# Patient Record
Sex: FEMALE | Race: BLACK OR AFRICAN AMERICAN | NOT HISPANIC OR LATINO | Employment: UNEMPLOYED | URBAN - METROPOLITAN AREA
[De-identification: names, ages, dates, MRNs, and addresses within clinical notes are randomized per-mention and may not be internally consistent; named-entity substitution may affect disease eponyms.]

---
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
## 2020-06-01 ENCOUNTER — OFFICE VISIT (OUTPATIENT)
Dept: FAMILY MEDICINE | Facility: CLINIC | Age: 20
End: 2020-06-01
Payer: COMMERCIAL

## 2020-06-01 VITALS
OXYGEN SATURATION: 100 % | WEIGHT: 168.8 LBS | BODY MASS INDEX: 26.49 KG/M2 | TEMPERATURE: 98.4 F | HEIGHT: 67 IN | RESPIRATION RATE: 18 BRPM | SYSTOLIC BLOOD PRESSURE: 125 MMHG | DIASTOLIC BLOOD PRESSURE: 83 MMHG | HEART RATE: 76 BPM

## 2020-06-01 DIAGNOSIS — O20.9 VAGINAL BLEEDING IN PREGNANCY, FIRST TRIMESTER: ICD-10-CM

## 2020-06-01 DIAGNOSIS — O20.9 VAGINAL BLEEDING IN PREGNANCY, FIRST TRIMESTER: Primary | ICD-10-CM

## 2020-06-01 ASSESSMENT — MIFFLIN-ST. JEOR: SCORE: 1562.17

## 2020-06-01 NOTE — PROGRESS NOTES
Preceptor Attestation:   Patient seen, evaluated and discussed with the resident. I have verified the content of the note, which accurately reflects my assessment of the patient and the plan of care.  Supervising Physician:Eliza Faulkner MD  [facility]

## 2020-06-02 ENCOUNTER — TELEPHONE (OUTPATIENT)
Dept: FAMILY MEDICINE | Facility: CLINIC | Age: 20
End: 2020-06-02

## 2020-06-02 NOTE — TELEPHONE ENCOUNTER
[facility] phone call message- patient requesting results:    Test: Lab    Date of test: 06-    Additional Comments: Patient wanting to know if blood work results are back.    OK to leave a message on voice mail? Yes    Primary language: English      needed? No    Call taken on June 2, 2020 at 9:54 AM by Buzz Wolf

## 2020-07-07 ENCOUNTER — PRENATAL OFFICE VISIT - HEALTHEAST (OUTPATIENT)
Dept: MIDWIFE SERVICES | Facility: CLINIC | Age: 20
End: 2020-07-07

## 2020-07-07 DIAGNOSIS — O03.9 SAB (SPONTANEOUS ABORTION): ICD-10-CM

## 2020-07-07 LAB — HCG SERPL-ACNC: 2 MLU/ML (ref 0–4)

## 2020-07-07 ASSESSMENT — MIFFLIN-ST. JEOR: SCORE: 1579.62

## 2020-07-08 ENCOUNTER — COMMUNICATION - HEALTHEAST (OUTPATIENT)
Dept: ADMINISTRATIVE | Facility: CLINIC | Age: 20
End: 2020-07-08

## 2020-07-08 DIAGNOSIS — N93.9 ABNORMAL VAGINAL BLEEDING: ICD-10-CM

## 2020-07-09 ENCOUNTER — AMBULATORY - HEALTHEAST (OUTPATIENT)
Dept: SCHEDULING | Facility: CLINIC | Age: 20
End: 2020-07-09

## 2020-07-09 DIAGNOSIS — N93.9 ABNORMAL VAGINAL BLEEDING: ICD-10-CM

## 2020-08-14 ENCOUNTER — COMMUNICATION - HEALTHEAST (OUTPATIENT)
Dept: SCHEDULING | Facility: CLINIC | Age: 20
End: 2020-08-14

## 2020-08-17 ENCOUNTER — OFFICE VISIT - HEALTHEAST (OUTPATIENT)
Dept: FAMILY MEDICINE | Facility: CLINIC | Age: 20
End: 2020-08-17

## 2020-08-17 DIAGNOSIS — O21.0 HYPEREMESIS GRAVIDARUM: ICD-10-CM

## 2020-08-18 ENCOUNTER — PRENATAL OFFICE VISIT - HEALTHEAST (OUTPATIENT)
Dept: MIDWIFE SERVICES | Facility: CLINIC | Age: 20
End: 2020-08-18

## 2020-08-18 ENCOUNTER — AMBULATORY - HEALTHEAST (OUTPATIENT)
Dept: MIDWIFE SERVICES | Facility: CLINIC | Age: 20
End: 2020-08-18

## 2020-08-18 DIAGNOSIS — Z34.80 SUPERVISION OF OTHER NORMAL PREGNANCY, ANTEPARTUM: ICD-10-CM

## 2020-08-18 DIAGNOSIS — Z87.59 HISTORY OF MISCARRIAGE: ICD-10-CM

## 2020-08-18 LAB
BASOPHILS # BLD AUTO: 0.1 THOU/UL (ref 0–0.2)
BASOPHILS NFR BLD AUTO: 1 % (ref 0–2)
EOSINOPHIL # BLD AUTO: 0.1 THOU/UL (ref 0–0.4)
EOSINOPHIL NFR BLD AUTO: 1 % (ref 0–6)
ERYTHROCYTE [DISTWIDTH] IN BLOOD BY AUTOMATED COUNT: 13.8 % (ref 11–14.5)
HBA1C MFR BLD: 5.5 %
HCT VFR BLD AUTO: 40.7 % (ref 35–47)
HGB BLD-MCNC: 13.2 G/DL (ref 12–16)
HIV 1+2 AB+HIV1 P24 AG SERPL QL IA: NEGATIVE
IMM GRANULOCYTES # BLD: 0 THOU/UL
IMM GRANULOCYTES NFR BLD: 0 %
LYMPHOCYTES # BLD AUTO: 2.4 THOU/UL (ref 0.8–4.4)
LYMPHOCYTES NFR BLD AUTO: 23 % (ref 20–40)
MCH RBC QN AUTO: 26.7 PG (ref 27–34)
MCHC RBC AUTO-ENTMCNC: 32.4 G/DL (ref 32–36)
MCV RBC AUTO: 82 FL (ref 80–100)
MONOCYTES # BLD AUTO: 0.7 THOU/UL (ref 0–0.9)
MONOCYTES NFR BLD AUTO: 7 % (ref 2–10)
NEUTROPHILS # BLD AUTO: 7 THOU/UL (ref 2–7.7)
NEUTROPHILS NFR BLD AUTO: 69 % (ref 50–70)
PLATELET # BLD AUTO: 272 THOU/UL (ref 140–440)
PMV BLD AUTO: 11.1 FL (ref 8.5–12.5)
RBC # BLD AUTO: 4.95 MILL/UL (ref 3.8–5.4)
WBC: 10.2 THOU/UL (ref 4–11)

## 2020-08-18 ASSESSMENT — MIFFLIN-ST. JEOR: SCORE: 1593.22

## 2020-08-18 ASSESSMENT — EDINBURGH POSTNATAL DEPRESSION SCALE (EPDS): TOTAL SCORE: 0

## 2020-08-19 LAB
ABO/RH(D): NORMAL
ABORH REPEAT: NORMAL
ANTIBODY SCREEN: NEGATIVE
BACTERIA SPEC CULT: NORMAL
C TRACH DNA SPEC QL PROBE+SIG AMP: NEGATIVE
HBV SURFACE AG SERPL QL IA: NEGATIVE
HCV AB SERPL QL IA: NEGATIVE
N GONORRHOEA DNA SPEC QL NAA+PROBE: NEGATIVE
RUBV IGG SERPL QL IA: POSITIVE
T PALLIDUM AB SER QL: NEGATIVE

## 2020-08-24 ENCOUNTER — RECORDS - HEALTHEAST (OUTPATIENT)
Dept: ADMINISTRATIVE | Facility: OTHER | Age: 20
End: 2020-08-24

## 2020-08-28 ENCOUNTER — COMMUNICATION - HEALTHEAST (OUTPATIENT)
Dept: MIDWIFE SERVICES | Facility: CLINIC | Age: 20
End: 2020-08-28

## 2020-09-18 ENCOUNTER — PRENATAL OFFICE VISIT - HEALTHEAST (OUTPATIENT)
Dept: MIDWIFE SERVICES | Facility: CLINIC | Age: 20
End: 2020-09-18

## 2020-09-18 ENCOUNTER — AMBULATORY - HEALTHEAST (OUTPATIENT)
Dept: MATERNAL FETAL MEDICINE | Facility: HOSPITAL | Age: 20
End: 2020-09-18

## 2020-09-18 DIAGNOSIS — Z34.00 SUPERVISION OF NORMAL FIRST PREGNANCY, ANTEPARTUM: ICD-10-CM

## 2020-09-18 DIAGNOSIS — O26.90 PREGNANCY, ANTEPARTUM, COMPLICATIONS: ICD-10-CM

## 2020-09-18 ASSESSMENT — MIFFLIN-ST. JEOR: SCORE: 1584.15

## 2020-09-28 ENCOUNTER — OFFICE VISIT - HEALTHEAST (OUTPATIENT)
Dept: MATERNAL FETAL MEDICINE | Facility: HOSPITAL | Age: 20
End: 2020-09-28

## 2020-09-28 DIAGNOSIS — O26.90 PREGNANCY, ANTEPARTUM, COMPLICATIONS: ICD-10-CM

## 2020-09-28 DIAGNOSIS — Z34.00 SUPERVISION OF NORMAL FIRST PREGNANCY, ANTEPARTUM: ICD-10-CM

## 2020-09-28 DIAGNOSIS — Z36.9 VISIT FOR PRENATAL SCREENING: ICD-10-CM

## 2020-10-16 ENCOUNTER — OFFICE VISIT - HEALTHEAST (OUTPATIENT)
Dept: MIDWIFE SERVICES | Facility: CLINIC | Age: 20
End: 2020-10-16

## 2020-10-16 ENCOUNTER — COMMUNICATION - HEALTHEAST (OUTPATIENT)
Dept: MIDWIFE SERVICES | Facility: CLINIC | Age: 20
End: 2020-10-16

## 2020-10-16 DIAGNOSIS — O99.891 BACK PAIN AFFECTING PREGNANCY IN SECOND TRIMESTER: ICD-10-CM

## 2020-10-16 DIAGNOSIS — Z34.00 SUPERVISION OF NORMAL FIRST PREGNANCY, ANTEPARTUM: ICD-10-CM

## 2020-10-16 DIAGNOSIS — M54.9 BACK PAIN AFFECTING PREGNANCY IN SECOND TRIMESTER: ICD-10-CM

## 2020-11-16 ENCOUNTER — PRENATAL OFFICE VISIT - HEALTHEAST (OUTPATIENT)
Dept: MIDWIFE SERVICES | Facility: CLINIC | Age: 20
End: 2020-11-16

## 2020-11-16 DIAGNOSIS — Z34.00 SUPERVISION OF NORMAL FIRST PREGNANCY, ANTEPARTUM: ICD-10-CM

## 2020-11-16 ASSESSMENT — MIFFLIN-ST. JEOR: SCORE: 1638.58

## 2020-11-17 ENCOUNTER — AMBULATORY - HEALTHEAST (OUTPATIENT)
Dept: OBGYN | Facility: CLINIC | Age: 20
End: 2020-11-17

## 2020-11-19 ENCOUNTER — COMMUNICATION - HEALTHEAST (OUTPATIENT)
Dept: OBGYN | Facility: CLINIC | Age: 20
End: 2020-11-19

## 2020-11-19 ENCOUNTER — COMMUNICATION - HEALTHEAST (OUTPATIENT)
Dept: ADMINISTRATIVE | Facility: CLINIC | Age: 20
End: 2020-11-19

## 2020-12-21 ENCOUNTER — PRENATAL OFFICE VISIT - HEALTHEAST (OUTPATIENT)
Dept: MIDWIFE SERVICES | Facility: CLINIC | Age: 20
End: 2020-12-21

## 2020-12-21 DIAGNOSIS — Z34.00 SUPERVISION OF NORMAL FIRST PREGNANCY, ANTEPARTUM: ICD-10-CM

## 2021-01-04 ENCOUNTER — COMMUNICATION - HEALTHEAST (OUTPATIENT)
Dept: ADMINISTRATIVE | Facility: CLINIC | Age: 21
End: 2021-01-04

## 2021-01-04 ENCOUNTER — AMBULATORY - HEALTHEAST (OUTPATIENT)
Dept: MIDWIFE SERVICES | Facility: CLINIC | Age: 21
End: 2021-01-04

## 2021-01-04 ENCOUNTER — PRENATAL OFFICE VISIT - HEALTHEAST (OUTPATIENT)
Dept: MIDWIFE SERVICES | Facility: CLINIC | Age: 21
End: 2021-01-04
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
## 2021-01-04 DIAGNOSIS — Z34.00 SUPERVISION OF NORMAL FIRST PREGNANCY, ANTEPARTUM: ICD-10-CM

## 2021-01-04 LAB
FASTING STATUS PATIENT QL REPORTED: YES
GLUCOSE 1H P 50 G GLC PO SERPL-MCNC: 102 MG/DL (ref 70–139)
HGB BLD-MCNC: 11.7 G/DL (ref 12–16)

## 2021-01-04 ASSESSMENT — MIFFLIN-ST. JEOR: SCORE: 1670.34

## 2021-01-05 LAB — T PALLIDUM AB SER QL: NEGATIVE

## 2021-01-19 ENCOUNTER — COMMUNICATION - HEALTHEAST (OUTPATIENT)
Dept: ADMINISTRATIVE | Facility: CLINIC | Age: 21
End: 2021-01-19

## 2021-01-26 ENCOUNTER — PRENATAL OFFICE VISIT - HEALTHEAST (OUTPATIENT)
Dept: MIDWIFE SERVICES | Facility: CLINIC | Age: 21
End: 2021-01-26

## 2021-01-26 DIAGNOSIS — Z34.00 SUPERVISION OF NORMAL FIRST PREGNANCY, ANTEPARTUM: ICD-10-CM

## 2021-01-26 ASSESSMENT — MIFFLIN-ST. JEOR: SCORE: 1693.02

## 2021-02-10 ENCOUNTER — COMMUNICATION - HEALTHEAST (OUTPATIENT)
Dept: ADMINISTRATIVE | Facility: CLINIC | Age: 21
End: 2021-02-10

## 2021-03-10 ENCOUNTER — PRENATAL OFFICE VISIT - HEALTHEAST (OUTPATIENT)
Dept: MIDWIFE SERVICES | Facility: CLINIC | Age: 21
End: 2021-03-10

## 2021-03-10 DIAGNOSIS — O99.210 OBESITY IN PREGNANCY: ICD-10-CM

## 2021-03-10 DIAGNOSIS — Z34.00 SUPERVISION OF NORMAL FIRST PREGNANCY, ANTEPARTUM: ICD-10-CM

## 2021-03-10 LAB — HGB BLD-MCNC: 11.6 G/DL (ref 12–16)

## 2021-03-10 ASSESSMENT — EDINBURGH POSTNATAL DEPRESSION SCALE (EPDS): TOTAL SCORE: 1

## 2021-03-11 LAB
ALLERGIC TO PENICILLIN: NO
GP B STREP DNA SPEC QL NAA+PROBE: NEGATIVE

## 2021-03-15 ENCOUNTER — PRENATAL OFFICE VISIT - HEALTHEAST (OUTPATIENT)
Dept: MIDWIFE SERVICES | Facility: CLINIC | Age: 21
End: 2021-03-15

## 2021-03-15 DIAGNOSIS — O99.210 OBESITY IN PREGNANCY: ICD-10-CM

## 2021-03-15 DIAGNOSIS — Z34.00 SUPERVISION OF NORMAL FIRST PREGNANCY, ANTEPARTUM: ICD-10-CM

## 2021-03-22 ENCOUNTER — COMMUNICATION - HEALTHEAST (OUTPATIENT)
Dept: OBGYN | Facility: CLINIC | Age: 21
End: 2021-03-22

## 2021-03-22 DIAGNOSIS — N94.89 UTERINE CRAMPING: ICD-10-CM

## 2021-03-23 ENCOUNTER — PRENATAL OFFICE VISIT - HEALTHEAST (OUTPATIENT)
Dept: MIDWIFE SERVICES | Facility: CLINIC | Age: 21
End: 2021-03-23

## 2021-03-23 DIAGNOSIS — O26.893 VAGINAL DISCHARGE DURING PREGNANCY IN THIRD TRIMESTER: ICD-10-CM

## 2021-03-23 DIAGNOSIS — Z34.80 SUPERVISION OF OTHER NORMAL PREGNANCY, ANTEPARTUM: ICD-10-CM

## 2021-03-23 DIAGNOSIS — N89.8 VAGINAL DISCHARGE DURING PREGNANCY IN THIRD TRIMESTER: ICD-10-CM

## 2021-03-23 DIAGNOSIS — O99.210 OBESITY IN PREGNANCY: ICD-10-CM

## 2021-03-23 LAB
CLUE CELLS: NORMAL
TRICHOMONAS, WET PREP: NORMAL
YEAST, WET PREP: NORMAL

## 2021-03-23 ASSESSMENT — MIFFLIN-ST. JEOR: SCORE: 1729.3

## 2021-04-07 ENCOUNTER — PRENATAL OFFICE VISIT - HEALTHEAST (OUTPATIENT)
Dept: MIDWIFE SERVICES | Facility: CLINIC | Age: 21
End: 2021-04-07

## 2021-04-07 DIAGNOSIS — Z34.03 ENCOUNTER FOR SUPERVISION OF NORMAL FIRST PREGNANCY IN THIRD TRIMESTER: ICD-10-CM

## 2021-04-07 ASSESSMENT — MIFFLIN-ST. JEOR: SCORE: 1756.52

## 2021-04-08 ENCOUNTER — AMBULATORY - HEALTHEAST (OUTPATIENT)
Dept: LAB | Facility: CLINIC | Age: 21
End: 2021-04-08

## 2021-04-08 DIAGNOSIS — Z34.03 ENCOUNTER FOR SUPERVISION OF NORMAL FIRST PREGNANCY IN THIRD TRIMESTER: ICD-10-CM

## 2021-04-08 LAB
SARS-COV-2 PCR COMMENT: NORMAL
SARS-COV-2 RNA SPEC QL NAA+PROBE: NEGATIVE
SARS-COV-2 VIRUS SPECIMEN SOURCE: NORMAL

## 2021-04-09 ENCOUNTER — COMMUNICATION - HEALTHEAST (OUTPATIENT)
Dept: SCHEDULING | Facility: CLINIC | Age: 21
End: 2021-04-09

## 2021-04-09 ENCOUNTER — ANESTHESIA - HEALTHEAST (OUTPATIENT)
Dept: OBGYN | Facility: HOSPITAL | Age: 21
End: 2021-04-09

## 2021-04-11 ENCOUNTER — COMMUNICATION - HEALTHEAST (OUTPATIENT)
Dept: HOME HEALTH SERVICES | Facility: HOME HEALTH | Age: 21
End: 2021-04-11

## 2021-05-26 VITALS
WEIGHT: 177 LBS | BODY MASS INDEX: 28.45 KG/M2 | SYSTOLIC BLOOD PRESSURE: 122 MMHG | HEIGHT: 66 IN | DIASTOLIC BLOOD PRESSURE: 80 MMHG | HEART RATE: 60 BPM

## 2021-05-27 VITALS
SYSTOLIC BLOOD PRESSURE: 110 MMHG | BODY MASS INDEX: 28.61 KG/M2 | HEART RATE: 64 BPM | WEIGHT: 178 LBS | HEIGHT: 66 IN | DIASTOLIC BLOOD PRESSURE: 66 MMHG

## 2021-05-27 VITALS
WEIGHT: 197 LBS | DIASTOLIC BLOOD PRESSURE: 62 MMHG | HEIGHT: 66 IN | SYSTOLIC BLOOD PRESSURE: 116 MMHG | HEART RATE: 84 BPM | BODY MASS INDEX: 31.66 KG/M2

## 2021-05-28 ENCOUNTER — COMMUNICATION - HEALTHEAST (OUTPATIENT)
Dept: MIDWIFE SERVICES | Facility: CLINIC | Age: 21
End: 2021-05-28

## 2021-05-28 ENCOUNTER — COMMUNICATION - HEALTHEAST (OUTPATIENT)
Dept: ADMINISTRATIVE | Facility: CLINIC | Age: 21
End: 2021-05-28

## 2021-06-04 VITALS — BODY MASS INDEX: 29.38 KG/M2 | WEIGHT: 182 LBS

## 2021-06-07 ENCOUNTER — OFFICE VISIT - HEALTHEAST (OUTPATIENT)
Dept: MIDWIFE SERVICES | Facility: CLINIC | Age: 21
End: 2021-06-07

## 2021-06-07 DIAGNOSIS — Z30.09 ENCOUNTER FOR GENERAL COUNSELING AND ADVICE ON CONTRACEPTIVE MANAGEMENT: ICD-10-CM

## 2021-06-07 DIAGNOSIS — Z01.812 PRE-PROCEDURE LAB EXAM: ICD-10-CM

## 2021-06-07 ASSESSMENT — EDINBURGH POSTNATAL DEPRESSION SCALE (EPDS): TOTAL SCORE: 1

## 2021-06-07 ASSESSMENT — MIFFLIN-ST. JEOR: SCORE: 1620.44

## 2021-06-09 NOTE — PROGRESS NOTES
Assessment/Plan:        Diagnoses and all orders for this visit:    SAB (spontaneous )  -     Beta-hCG, Quantitative    - depending on outcome will order pelvic ultrasound.   - consider progesterone or LYNDON for bleeding if results WNL.   - continue prenatal vitamin.     - O positive blood type     Total time spent with patient 20 minutes, >50% counseling, education and coordination of care.  Subjective:    Patient ID: Vero Lima is a 20 y.o. female.    HPI    Patient's last menstrual period was 2020. She would be 8 wks 6 days today.     Vero presents for follow up for miscarriage.  Seen in the ED multiple times for vaginal bleeding in early pregnancy.  Last clinic visit was  at [facility]. Those records and labs are reviewed.   She has had serial HCG levels.  On  it was 504. On  it was 625. On 6/ and on  it was 34. She is still spotting today. It is light and fluctuates between light and dark. She has no pelvic pain or cramping.   She has had unprotected intercourse during the last few weeks. The HCG of 34 could be a declining SAB or early new pregnancy. She would be happy with a new pregnancy. If not a new pregnancy she would like something to help her stop bleeding. Due for pap.    The following portions of the patient's history were reviewed and updated as appropriate: allergies, current medications, past medical history and problem list.      Review of Systems   Constitutional: Negative.    Gastrointestinal: Negative.    Genitourinary: Positive for vaginal bleeding.         Objective:    Physical Exam   Constitutional: She appears well-developed. No distress.   Psychiatric: She has a normal mood and affect. Her behavior is normal. Thought content normal.

## 2021-06-09 NOTE — TELEPHONE ENCOUNTER
Spoke with Vero.   Will try mirna to stop vaginal bleeding. No contraindications.   Vaginal ultrasound ordered.  DOMENIC Quevedo,CNM

## 2021-06-09 NOTE — TELEPHONE ENCOUNTER
Name of caller: Patient  Phone number where you may be reached: [phone]  Reason for call: pls call pt re results/MyChart note from appt yesterday and what next steps are.  Best time to call back: any  If we don't reach you, is it okay to leave a detailed message? yes

## 2021-06-10 NOTE — PATIENT INSTRUCTIONS - HE
[facility]  project  Everyday Miracles for Doulas.  These educational videos were created to support you and your family through pregnancy, labor and birth, the postpartum period and caring for your  up until 3 months.  Link to videos: [email or web address]   WHAT IS A ?  Sheila Jasso defines a  as a professional who is trained in childbirth and provides continuous support to a mother before, during, and just after birth.    Doulas provide emotional support, help provide comfort measures, relaxation and breathing techniques, and suggest movement and position changes during labor.  They are with the mother continuously when she is needing additional support.  They can provide assistance to the partner of the woman and help her communicate with her care provider.    Doulas are not medical professionals.  They do not give medical advice or perform medical procedures.  They don t deliver the baby.  They also don t replace the partner of the mother.  They assist them both through the birthing process.  Continuous support is so important to a meaningful and healthy birth.  Medical providers, like doctors, midwives, or nurses can be very helpful in providing support during labor, however they have the additional responsibility of ensuring safety of the mom and baby.  They will not be with you while you labor at home and may also be caring for more than one patient at a time when you arrive to your birthing center.  Many doulas provide support in your home as well as in the birthing center.    There is a large amount of evidence that supports doulas during the birth process.  Statistically, women who have continuous support by a  are more likely to have a vaginal birth, use less pain medication, have fewer vacuum or forcep deliveries, and fewer  sections.  Compared to a non- supporter (like partner, nurse, friend, or family member) women who have doulas experience:   31%  decrease in use of Pitocin   28% decrease in risk of  birth   12% increase in spontaneous vaginal birth   9% decrease in use of medication during labor   14% decrease in newborns admitted to special care nursery   34% decrease in being dissatisfied with birth experience        (Evidence Based Birth)  Generally there is a fee for  services, but sometimes insurance will cover fees, services may be provided on a sliding scale based on your income, or volunteer organizations may provide services free of charge.    Resources For Finding a     Childbirth Collective  http://www.childbirthcollective.org/  [facility]  Project   http://twincitiesdoulaproject.com/  Everyday Miracles  https://everyday-miracles.org/  Doulas [facility] http://www.samantha.org/  [facility] has a volunteer  program      Pregnancy: Body Changes   From conception (fertilization) until after the birth of your child, you and your baby will change every day. To help you understand what is happening, we ve outlined how pregnancy begins and some of the changes you may notice.   How Pregnancy Begins   Conception is the union of a sperm and an egg. When it occurs, your baby s genetic makeup is complete, even its sex. Fertilization takes place in the fallopian tube. The fertilized egg then travels down this tube to the uterus (womb). The egg attaches to the lining of the uterus about a week later. There it grows and is nourished.   Your Changing Body   Pregnancy affects almost every part of your body. You may notice some of the following physical and emotional changes:   Your uterus expands outward and upward as your baby grows. You may feel pressure on your bladder, stomach, and other organs.   You may notice skin color changes on your forehead, nose, and cheeks. A dark line may form from your bellybutton down to your pubic area. The skin color around your nipples and thighs may also change.   Pink stretch marks may  appear on your abdomen, breasts, or hips.   Your hair may seem thicker. You lose less hair during pregnancy.   You may feel fine one day and weepy the next. This is caused by changes in your body, such as increased hormones (chemicals that affect the function of certain organs and also your moods).  Adapting to Pregnancy: First Trimester   As your body adjusts, you may have to change or limit your daily activities. You ll need more rest. You may also need to use the energy you have more wisely.       Your Changing Body   Almost every part of your body is affected as you adapt to pregnancy. The uterus and cervix will begin to soften right away. You may not look very pregnant during the first three months. But you are likely to have some common signs of early pregnancy:   Nausea   Fatigue   Frequent urination   Mood swings   Bloating of the abdomen   Missed or light periods (first trimester bleeding)   Nipple or breast tenderness, breast swelling  It s Not Too Late to Start Good Habits   What matters most is protecting your baby from this moment on. If you smoke, drink alcohol, or use drugs, now is the time to stop. If you need help, talk with your health care provider.   Smoking increases the risk ofÂ Â stillbirthÂ or having a low-birth-weight baby. If you smoke, quit now.   Alcohol and drugs have been linked with miscarriage, birth defects, intellectual disability, and low birth weight. Do not drink alcohol or take drugs.  Tips to Relieve Nausea   Although nausea can occur at any time of the day, it may be worse in the morning. To help prevent nausea:   Eat small, light meals at frequent intervals.   Get up slowly. Eat a few unsalted crackers before you get out of bed.   Drink water with lemon slices.   Eat an ice popÂ in your favorite flavor.   Ask your health care provider about taking nasim or vitamin B6 for nausea and vomiting.   Talk with your health care provider if you take vitamins that upset your  "stomach.  Work Concerns   The end of the first trimester is a good time to discuss working during pregnancy with your employer. Follow your health care provider s advice if your job requires you to stand for a long time, work with hazardous tools, or even sit at a desk all day. Your workspace, workload, or scheduled hours may need to be adjusted. Perhaps you can change body postures more often or take an extra break.   Advice for Travel   Talk to your health care provider first, but the second trimester may be the best time for any travel. You may be advised to avoid certain trips while you re pregnant. Food and water can be concerns in developing countries. Travel by car is a good choice, as you can stop, get out, and stretch. Bring snacks and water along. Fasten the lap belt below your belly, low over your hips. Also be sure to wear the shoulder harness.   Intimacy   Unless your health care provider tells you to, there is no reason to stop having sex while you re pregnant. You or your partner may notice changes in desire. Desire may be less in the first trimester, due to nausea and fatigue. In the second trimester, sex may be very enjoyable. The third trimester can be a challenge comfort-wise. Try different positions and see what s best for you both.   Pregnancy: Your First Trimester Changes   The first trimester is a time of rapid development for your baby. Because your baby is growing so quickly, it is important that you start a healthy lifestyle right away. By the end of the first trimester, your baby has formed all of its major body organs and weighs just over an ounce.       Actual size of baby is 1/4\"    Month 1 (Weeks 1-4)   The placenta (the organ that nourishes your baby) begins to form. The heart and lungs begin to develop. Your baby is about 1/4 inch long by the end of the first month.       Actual size of baby is 1\"    Month 2 (Weeks 5-8)   All of your baby s major body organs form. The face, fingers, " "toes, ears, and eyes appear. By the end of the month, your baby is about 1 inch long.       Actual size of baby is 4\"    Month 3 (Weeks 9-12)   Your baby can open and close its fists and mouth. The sexual organs begin to form. As the first trimester ends, your baby is about 4 inches long.       Pregnancy: Your Weight   Being a healthy weight is important for both you and your baby. The weight you gain now is not just extra fat. It is also the weight of your baby. And it is the increased blood and fluids to support the baby. A slow, steady rate of gain is best. How much you should gain depends on your weight before getting pregnant. Check with your health care provider to find out what is right for you.   If You Gain Too Much   Gaining too much weight might cause you to feel tired or you could have a harder pregnancy or birth. If you and your health care provider decide you re gaining too much:   Eat fewer fats and sugars. Instead, eat fruit, vegetables, and whole-grain foods.   Drink plenty of water between meals.   Get at least 20 minutes of light exercise, such as walking, each day.   Don t diet. You might not get enough of the nutrients you or your baby needs.   Keep a diet diary to help you gauge what and how much you are eating .  If You re Not Gaining Enough   If you don t gain enough, your baby could be too small or have health problems. Women tend to gain most of their weight in the second and third trimesters. For now:   Eat many types of foods. Make sure you get enough calcium, protein, and carbohydrates.   Don t skip meals.   Eat healthy snacks.   Pick nutrient-dense, high calorie healthy food like trail mix or protein shakes.   See a dietitian for help.   Talk to your healthcare provider if you have had an eating disorder or problems with certain foods.  Pregnancy: Common Questions   There are plenty of myths and  old wives  tales  surrounding pregnancy. You may need help  fact from fiction. On " this sheet, you ll find answers to a few common questions. If you have other questions, talk with your health care provider.   Will Working Harm My Baby?   In most cases, working throughout your pregnancy is not harmful at all. There may be concerns if the job involves dangerous machinery or chemicals, lifting, or standing for very long periods of time. Talk to your health care provider and employer about your particular job and pregnancy.   Why Can t I Change the Cat Litter Box?   Cats carry a disease called toxoplasmosis. In adult humans, it shows up as a mild infection of the blood and organs. If you are infected during pregnancy, the baby s brain and eyes could be damaged. To be safe, have someone else change the litter. If you must handle it, wear a paper mask over your nose and mouth. Also, wear gloves and wash your hands afterward.   Which Medications Are Safe?   No prescription or over-the-counter drug is safe for everyone all of the time. But sometimes medications are needed. Be sure your health care provider knows you are pregnant. Then use only the medications he or she advises you to take.   Is It True That I Can Overheat My Baby?   Yes. To avoid making your baby too warm:   Don t sit in a jacuzzi. A long, warm bath is fine, but not in water over 100Â F.   Exercise less intensely if you feel fatigued. Base your workout on how you feel, not your heart rate. Heart rates aren t a good way to measure effort during pregnancy.  Can I Lift and Carry Safely?   Yes, if your health care provider doesn t tell you otherwise. Learn to lift and carry safely to avoid injury and reduce back pain during pregnancy. To protect your back:   Bend at the knees to bring the load nearer.   Get a good . Test the weight of the load.   Tighten your abdomen. Exhale as you lift.   Lift with your legs, not with your back.   Carry the load close to your body.   Hold the load so you can see where you are going.  What If I Get Sick?    Most women get sick at least once during pregnancy. Talk with your health care provider if you do. Most likely it will not affect your pregnancy. Get plenty of rest and fluids, and eat what you can. Talk to your health care provider before taking any medications.   [facility] Nurse Midwives - Contact information:   Appointment line and to get a hold of CNM in clinic Monday-Friday 8 am - 5 pm: [phone]   CNM on call answering service: [phone]. Specify hospital of choice and leave a brief message for CNM;  will then page CNM who is on call at your specified hospital and you should receive a call back with 15 minutes. Be sure that your ringer is audible and that you can accept blocked calls so that we can get back in touch with you! This number should bereservedfor urgent needs if during the day, after hours, weekends, holidays   HEALTHY PREGNANCY CARE: 10-14 WEEKS PREGNANT   By weeks 10 to 14 of your pregnancy, the placenta has formed inside your uterus. It may be possible to hear your baby's heartbeat with a doppler ultrasound device. Your baby's eyes can and do move. The arms and legs can bend.   The second trimester genetic screening tests for Down's Syndrome, Trisomy 18, and neural tube defects (which are known collectively as a quad screen) are done at 15 to 22 weeks. It's your choice whether to have these tests. You and your partner can talk to your midwife or physician about birth defects tests.   Consider breastfeeding for the healthiest way to feed your baby. Ask your midwife or physician for more information.   As your center of gravity and weight changes, use good body mechanics when changing positions and lifting. For example, use a straight back and your legs for support when lifting instead of bending over. Maintain good posture to prevent straining your muscles. Now is a good time to continue or restart your exercise program. Walking 30-60 minutes daily is an excellent way to keep  fit. Yoga and swimming also offer many benefits.   The nausea and fatigue of early pregnancy have usually started to let up, so this is a good time to focus on nutrition. Consider attending a nutrition class. A healthy diet includes about 60 grams of protein each day (3-4 servings of dairy, 2-3 servings of meat/fish/poultry/nuts), 4-6 servings of whole grain foods, and 5-6 servings of fruits and vegetables. Remember to drink 6-8 glasses of water daily.   Watch for warning signs, such as   vaginal bleeding   fluid leaking from your vagina   severe abdominal pain   nausea and vomiting more than 4-5 times a day, or if you are unable to keep anything down   fever more than 100.4 degrees F.   [facility] Nurse Midwives - Contact information:   Appointment line and to get a hold of CNM in clinic Monday-Friday 8 am - 5 pm: [phone]. There are some clinics with early start times (1st appointment 7:20 am) and others with evening hours (last appointment 6:20 pm). Most are typically open from 8 am to 5 pm.   CNM on call answering service: [phone]. Specify your hospital of choice and leave a brief message for CNM;  will then page CNM who is on call at your specified hospital and you should receive a call back with 15 minutes. Be sure that your ringer is audible and that you can accept blocked calls so that we can get back in touch with you! This number should be reserved for urgent needs if during the day, before 8 am, after 5 pm, weekends, holidays.   RESOURCES   You can refer to the Starting Out Right book or find it online at http://www.[facility].org/images/stories/maternity/[facility]-Starting-Out-Right.pdf or http://www.[facility].org/images/stories/flipbooks/[facility]-starting-out-right/[facility]-starting-out-right.html#p=8   You can sign up for a weekly parenting e-mail that gives support, tips and advice from health care professionals that starts with pregnancy and continues through the toddler  "years. To register, go to www.[email or web address] at any time during your pregnancy.   American College of Nurse-Midwives (ACNM) http://www.midwife.org/; look at the informational handouts at http://www.midwife.org/Share-With-Women   www.mymidwife.org   American pregnancy association - http://americanpregnancy.org   [employer] www.[employer]   FDA - Nutrition www.mypyramid.gov Under \"For Consumers,\" click on \"pregnant and breastfeeding women.\"   Vaccines : Centers for Disease Control and Prevention (CDC) http://www.cdc.gov/vaccines/   Centering Pregnancy (group prenatal care option): http://centeringhealthcare.org   Baby Center http://www.babycenter.com/   [facility] www.[facility].com   When researching information on the web, question the validity of websites. The [employer]s .gov, ChangeMob and.org tend to be more reliable information. If there are a lot of advertisements, be cautious of the information provided. Stay away from blogs and chat rooms please!   Nutrition & supplements:   CONSIDER ATTENDING OUT FREE NUTRITION CLASS (FIRST Saturday of each month from 9-11 am). Call the midwife appointment line for details and to schedule   Prenatal vitamin (those with 600-1000 mcg folic acid and 27 mg of iron are enough). Take with food or Juice   4-5 servings of dairy or other calcium rich foods (fish, leafy greens, soy) per day - if not, take 500-1000 mg additional calcium (Tums, pills, chews). Take with dairy   Vitamin D3 0334-6497 IU geltab daily. Take with fattiest meal. Look for fortified foods also (Dairy, Juice)   2-3 (4) oz servings of fish, seafood, nuts (walnuts & almonds), oils, avocado per week - if not, take Omega 3 Fatty acids: DHA & HELENE 9969-1237 mg per day. Other names: cod liver oil, fish oil. Take with fattiest meal. Some prenatals have DHA, but typically not a sufficient dose.   Fish: Do not eat shark, swordfish, ruben mackerel, or tilefish when you are pregnant or breastfeeding. They contain high " levels of mercury. Limit white (albacore) tuna to no more than 6 ounces per week

## 2021-06-10 NOTE — TELEPHONE ENCOUNTER
"Patient is 6 weeks pregnant and first OB appointment is 9/8/20.  Patient requesting to be seen today as she has had nausea and vomiting for past 1-2 weeks.  States she can't keep fluids down; \"puking up everything\".  States she is urinating adequately.  Advised to go to ED for evaluation and possible rehydration.  Patient prefers office visit.  Per scheduling, 1st available office appointment is 8/17/20.  FNA again encouraged patient to go to ED as she has not established care and patient prefers office visit on 8/17/20.    Gabrielle Beatty RN  [facility] Triage Nurse Advisor    Additional Information    SEVERE vomiting (e.g., > 10 times / day) and present > 8 hours    Negative: Sounds like a life-threatening emergency to the triager    Negative: Vaginal bleeding and pregnant < 20 weeks    Negative: Abdominal pain and pregnant < 20 weeks    Negative: Headache is the main complaint    Negative: Vomiting red blood or black (coffee ground) material    Negative: Insulin-dependent diabetes (Type I) and glucose > 400 mg/dL (22 mmol/L)    Negative: Recent head injury (within last 3 days)    Negative: Recent abdominal injury (within last 3 days)    Negative: Severe pain in one eye    Protocols used: PREGNANCY - MORNING SICKNESS (NAUSEA AND VOMITING OF PREGNANCY)-A-OH      "

## 2021-06-10 NOTE — PROGRESS NOTES
Assessment/Plan:        1. Hyperemesis gravidarum  tx options ( OTC vs prescription ) were reviewed and she opted for a prescription    Plan;   - ondansetron (ZOFRAN) 4 MG tablet; Take 1 tablet (4 mg total) by mouth every 8 (eight) hours as needed for nausea.  Dispense: 30 tablet; Refill: 1     Follow up with the OB.       At the conclusion of the encounter the plan of care, disposition and all questions were answered and reviewed, and the patient acknowledged understanding and was involved in the decision making regarding the overall care plan.     Patient Care Team:  Provider, No Primary Care as PCP - General          Subjective:    Patient ID:   Vero Lima is a 20 y.o. female with a self check pregnancy status, presenting with morning sickness for the past 2 weeks, unable to eat or drink.  She hasn't taken any medication for it yet and plans to follow up with the OB.        Review of Systems  Allergy: reviewed  General : negative  A complete 5 point review of systems was obtained and is negative other than what is stated in the HPI.      The following patient's history were reviewed and updated as appropriate:   She  has a past medical history of Depression and Migraines..      Outpatient Encounter Medications as of 8/17/2020   Medication Sig Dispense Refill     desogestreL-ethinyl estradioL (APRI) 0.15-0.03 mg per tablet One tab twice daily until bleeding stops Do not use smokeless tobacco or smoke while taking this medication. 1 Package 0     No facility-administered encounter medications on file as of 8/17/2020.          Objective:   /62 (Patient Site: Right Arm, Patient Position: Sitting, Cuff Size: Adult Regular)   Pulse 70   Temp 98.2  F (36.8  C) (Oral)   Wt 182 lb 11.2 oz (82.9 kg)   LMP 05/06/2020   BMI 29.49 kg/m        Physical Exam  General Appearance:    Alert, well hydrated, no distress   Throat:   mucous membranes moist, pharynx normal without lesions   Neck:   Supple, symmetrical,  trachea midline, no adenopathy;     thyroid:  no enlargement/tenderness/nodules;    Lungs:     clear to auscultation, no wheezes, rales or rhonchi, symmetric air entry     Heart:    Regular rate and rhythm, S1 and S2 normal, no murmur, rub   or gallop, no edema    Skin:    no rashes or lesions

## 2021-06-11 NOTE — PROGRESS NOTES
[facility]  Genetic Counseling Consult    Patient: Vero Lima YOB: 2000   Date of Service: 2020      Vero Lima was seen at [facility] for genetic consultation to discuss the options for screening and testing for fetal chromosome abnormalities.  The indication for genetic counseling is routine screening for aneuploidy.        Impression/Plan:   1.  Vero had a blood draw for NIPT (Innatal test through [facility]).  Results are expected within 7-10 days, and will be available in [place].  We will contact her to discuss the results, and a copy will be forwarded to the office of the referring OB provider.  Vero will be contacted at the number she provided, [phone], and requests that detailed results, excluding fetal sex chromosomes indicated by testing, be left in her voicemail if she cannot be reached.    2.  Maternal serum AFP (single marker screen) is recommended after 15 weeks to screen for open neural tube defects. A quad screen should not be performed.    3.  An 18-20 week comprehensive ultrasound is available to screen for birth defects and markers of aneuploidy.    Pregnancy History:   /Parity:    Age at Delivery: 20 y.o.  DEREK: 2021, by Ultrasound  Gestational Age: 12w6d    No significant complications or exposures were reported in the current pregnancy.    Vero french pregnancy history is significant for 1 prior first trimester SAB with no known cause.    Medical History:   Vero french reported medical history is not expected to impact pregnancy management or risks to fetal development.       Family History:   A three-generation pedigree was obtained, and is scanned under the  Media  tab.   The reported family history is negative for multiple miscarriages, stillbirths, birth defects, mental retardation, known genetic conditions, and consanguinity.       Carrier Screening:    The patient reports that she and the father of the pregnancy have mixed/unknown ancestry:        Expanded carrier screening for mutations in a large panel of genes associated with autosomal recessive conditions including cystic fibrosis, spinal muscular atrophy, and others, is now available.      The patient has declined the carrier screening options reviewed today.       Risk Assessment for Chromosome Conditions:   We explained that the risk for fetal chromosome abnormalities increases with maternal age. We discussed specific features of common chromosome abnormalities, including Down syndrome, trisomy 13, trisomy 18, and sex chromosome trisomies.      - At age 20 at midtrimester, the risk to have a baby with Down syndrome is 1 in 1160.    - At age 20 at midtrimester, the risk to have a baby with any chromosome abnormality is 1 in 580.          Testing Options:   We discussed the following options:   First trimester screening    First trimester ultrasound with nuchal translucency and nasal bone assessments, maternal plasma hCG, KRISTYN-A, and AFP measurement    Screens for fetal trisomy 21, trisomy 13, and trisomy 18    Cannot screen for open neural tube defects; maternal serum AFP after 15 weeks is recommended    ,  Non-invasive Prenatal Testing (NIPT)    Maternal plasma cell-free DNA testing; first trimester ultrasound with nuchal translucency and nasal bone assessment is recommended, when appropriate    Screens for fetal trisomy 21, trisomy 13, trisomy 18, and sex chromosome aneuploidy    Cannot screen for open neural tube defects; maternal serum AFP after 15 weeks is recommended      ,  Genetic Amniocentesis    Invasive procedure typically performed in the second trimester by which amniotic fluid is obtained for the purpose of chromosome analysis and/or other prenatal genetic analysis    Diagnostic results; >99% sensitivity for fetal chromosome abnormalities    AFAFP measurement tests for open neural tube  defects     and  Comprehensive (Level II) ultrasound: Detailed ultrasound performed between 18-22 weeks gestation to screen for major birth defects and markers for aneuploidy.          We reviewed the benefits and limitations of this testing.  Screening tests provide a risk assessment specific to the pregnancy for certain fetal chromosome abnormalities, but cannot definitively diagnose or exclude a fetal chromosome abnormality.  Follow-up genetic counseling and consideration of diagnostic testing is recommended with any abnormal screening result.     Diagnostic tests carry inherent risks- including risk of miscarriage- that require careful consideration.  These tests can detect fetal chromosome abnormalities with greater than 99% certainty.  Results can be compromised by maternal cell contamination or mosaicism, and are limited by the resolution of cytogenetic G-banding technology.  There is no screening nor diagnostic test that can detect all forms of birth defects or mental disability.     It was a pleasure to be involved with Vero s care. Face-to-face time of the meeting was 30 minutes.    Jeancarlos Li MS, [facility]  Licensed Genetic Counselor  Phone: [phone]  Pager: [phone]

## 2021-06-11 NOTE — TELEPHONE ENCOUNTER
A: 1.  20-year-old  2 para 0-0-1-0 with IUP at 8 weeks 3 days  2.  Lower abdominal pain of unknown etiology, likely constipation    P: Reviewed ultrasound results with patient in its entirety.  Recommend warm hydrotherapy, sipping warm liquids and eating something like dry toast or crackers.  Plan to call patient back in a couple of hours.  Call back this writer with vaginal bleeding or worsening lower abdominal pain.  Vero has verbal understanding of the plan of care.    S: Vero is calling this morning to report no abdominal pain after vomiting following eating a plum.  Has experienced nausea and vomiting this pregnancy.  Denies vaginal bleeding.  Rates pain 5/10.  Recent ultrasound results below.  Denies fever, chills, dysuria or unusual vaginal discharge.  Experiencing some constipation this pregnancy.     O: Alert and in no apparent distress     US OB < 14 Weeks (Order [id number])   Imaging   Date: 2020  Department: [facility]  Released By/Authorizing: Michelle Carmona APRN, CNM (auto-released)    Study Result        EXAM DATE:         2020     EXAM: US OB BEFORE 14 WEEKS SINGLE FETUS  LOCATION: [facility]  DATE/TIME: 2020 4:45 PM     INDICATION: Encounter for supervision of other normal pregnancy, unspecified  trimester  COMPARISON: SPR: Pelvic US 2020  TECHNIQUE: Transabdominal scans were performed. Endovaginal ultrasound was  performed to better visualize the embryo.     FINDINGS:  UTERUS: Single normal appearing intrauterine gestation sac.  CRL: Measures 0.9 cm, equals 7 weeks 0 days.  FETAL HEART RATE: 153 bpm.  AMNIOTIC FLUID: Normal.  PLACENTA: Not yet formed. Tiny sliver of a subchorionic bleed measuring 1.6 x  0.2 cm near the fundus.     RIGHT OVARY: Normal 1.9 cm corpus luteal cyst.  LEFT OVARY: Normal.     IMPRESSION:  1.  Single living intrauterine gestation at 7 weeks 0 days., EDC 2021.     2.  Tiny sliver subchorionic  bleed..                  8/28/2020 4 PM    Final phone call.  Patient feeling much better.  Thanks she may have had lower abdominal pain due to eating [employer] 2 days in a row.  Danger signs and symptoms reviewed.  All questions answered.  Encouraged follow-up at initial OB appointment as scheduled.

## 2021-06-11 NOTE — PROGRESS NOTES
Vero SANDERS Lima is here with Catina for a routine prenatal visit at 11w3d.  She has no concerns and is feeling well. Reviewed IOB lab results.  First trimester screening was discussed and will be scheduled with M.  Appetite is normal. Interval weight gain is appropriate.  Anticipatory guidance, warning signs, when to call and CNM contact information reviewed. RTO in 4 weeks.

## 2021-06-12 NOTE — PATIENT INSTRUCTIONS - HE
[facility]- Contact information:  Appointment line and to get a hold of CNM in clinic Monday-Friday 8 am - 5 pm:  [phone].  There are some clinics with early start times (1st appointment 7:40 am) and others with evening hours (last appointment 6:20 pm).  Most are typically open from 8 am to 5 pm.    CNM on call answering service: [phone].  Specify your hospital of choice and leave a brief message for CNM;  will then page CNM who is on call at your specified hospital and you should receive a call back with 15 minutes.  Be sure that your ringer is audible and that you can accept blocked calls so that we can get back in touch with you! This number should be reserved for urgent needs if during the day, before 8 am, after 5 pm, weekends, holidays.    Contact the on-call CNM with warning signs, such as:    vaginal bleeding     Vaginal discharge and itching or pain and burning during urination    Leg/calf pain or swelling on one side    severe abdominal pain    nausea and vomiting more than 4-5 times a day, or if you are unable to keep anything down    fever more than 100.4 degrees F.         Touring the Maternity Care Center  At this time we are offering a virtual tour of the Maternity Care Centers at both [facility] and [facility]:   [facility]: [email or web address]-[email or web address]  [place]:   [email or web address]/[email or web address]/tours  [email or web address]://[email or web address]/[facility]/Maternity-Care-Center/#[email or web address]_tour  When in person tours become available, registrations is required. To schedule a tour at either [facility] or [facility], please do so online using the following links:  [facility] - [email or web address]  [facility]  [email or web address]         You are invited to  Meet the [facility]    A way to tour the hospital Labor and Delivery unit and meet the midwives in our group was postponed at the start of hospital restrictions following COVID-19. We will resume these when able and virtual options may be available in the future.     Please call [phone] for ongoing updates.      Ultrasound Appointment:   Don't forget to schedule your ultrasound appointment around 20 weeks into your pregnancy. Your midwife will order the exam for you to schedule at [phone] with [facility] radiology locations or at the independent radiology clinic of your preference.      GENETIC SCREENING OPTIONS AT [facility]          All testing is optional. We don t recommend or discourage any test; it is totally up to you and your partner. Some couples wish to know their risk of having a baby with a genetic defect and others do not. We will support your decision. Abnormal results may lead to a discussion of options for further testing.    Accurate dating of your pregnancy is important for all testing so an ultrasound may be done prior to referral or testing.    No testing provides certainty; there are false positives and negatives associated with all testing, some more than others.    Most genetic testing is non-invasive (requires only a blood sample and sometimes an ultrasound or both).    It is always wise to check with your insurance carrier before proceeding.    Some testing can be done at our lab and some require a referral.    If you decide to do no testing, the 20 week ultrasound scan has some ability to detect abnormalities in the baby.    Britney or Tigre    At 10 wk or greater, a blood sample can be drawn here at clinic or at any of our referral offices. It will provide highly accurate results with low false positive  rates for trisomy 18, trisomy 21 (Down Syndrome), and trisomy 13 (> 99% trisomy detection rate at a false positive rate of <0.1%). Gender identification can also be obtained if desired.    Quad Screen (4-marker screen)    Between 15 and 21 weeks, a sample of blood can be drawn at our lab to assess your risk of having a baby with Down Syndrome, Trisomy 18 and neural tube defects. Such testing is able to detect these conditions in 80-90% of cases and the false-positive rate is approximately 5%.     Why is dental care in pregnancy important?  During pregnancy, you are more likely to have problems with your teeth or gums. If you have an infection in your teeth or gums, the chance of your baby being premature (born early) or having low birth weight may be slightly higher than if your teeth and gums are healthy  Dental care is safe during pregnancy and important for the health of you and your baby.   What should you know before you see the dentist?    Make sure your dentist knows that you are pregnant.    If medications for infection or for pain are needed, your dentist can prescribe ones that are safe for you and your baby.    Tell your dentist about any changes you have noticed since you became pregnant and about any medications r or supplements you are taking.    Routine x-rays should be avoided in pregnancy, but it may be necessary if there is a problem or an emergency.     Your body should be covered with a lead apron to protect you and your baby.    Dental work can be done safely at any point in pregnancy. If possible, it is best to delay treatments and pro- cedures until after the first trimester.    For more information on dental health in pregnancy: http://onlinelibrary.garber.com/store/10.1111/jmwh.78268/asset/rszb52156.pdf?v=1&t=kxzy1o05&k=[email or web address]     Quickening:   Your Baby in the Second Trimester of Pregnancy  ; At the start of the second trimester, you will feel your baby's  movements, which get stronger as the baby grows bigger. At the end of the fourth month, your baby weighs about five ounces. Her kidneys begin to produce urine. During visits to your health care provider, you will be able to hear your baby's heartbeat more clearly. Your baby can move and hear your voice.   ; By the end of the fifth month, you'll be able to feel light movements (called quickening) of your fetus. Your baby is sleeping and waking at regular intervals, and is more active than before. At this point, she is about nine inches long and weighs about one-half to one pound. During the sixth month, your baby's features become clearer. Eyebrows, eyelashes, and hair are developing. She also has finger and toe prints, and may be kicking strongly.  ; By the end of the second trimester, your baby weighs as much as two pounds and is about 11 inches long.         Gestational diabetes  Gestational diabetes develops during pregnancy (gestation). Like other types of diabetes, gestational diabetes affects how your cells use sugar (glucose). Gestational diabetes causes high blood sugar that can affect your pregnancy and your baby's health.  Any pregnancy complication is concerning, but there's good news. Expectant moms can help control gestational diabetes by eating healthy foods, exercising and, if necessary, taking medication. Controlling blood sugar can prevent a difficult birth and keep you and your baby healthy.  In gestational diabetes, blood sugar usually returns to normal soon after delivery. But if you've had gestational diabetes, you're at risk for type 2 diabetes. You'll continue working with your health care team to monitor and manage your blood sugar.    Who is at risk?  This is a list of factors that increase the risk of developing gestational diabetes for women during pregnancy:        Overweight prior to pregnancy (20% or more over ideal body weight)        High risk ethnic group: , ,  ,         Impaired glucose tolerance or traces of glucose in the urine        Family history of diabetes        Previously giving birth to a baby over 9 lbs. or stillborn        Previous pregnancy with gestational diabetes    Prevention:  There are no guarantees when it comes to preventing gestational diabetes -- but the more healthy habits you can adopt before pregnancy, the better. If you've had gestational diabetes, these healthy choices may also reduce your risk of having it in future pregnancies or developing type 2 diabetes down the road.    Eat healthy foods. Choose foods high in fiber and low in fat and calories. Focus on fruits, vegetables and whole grains. Strive for variety to help you achieve your goals without compromising taste or nutrition. Watch portion sizes.     Keep active. Exercising before and during pregnancy can help protect you from developing gestational diabetes. Aim for 30 minutes of moderate activity on most days of the week. Take a brisk daily walk. Ride your bike. Swim laps.  If you can't fit a single 30-minute workout into your day, several shorter sessions can do just as much good. Park in the distant lot when you run errands. Get off the bus one stop before you reach your destination. Every step you take increases your chances of staying healthy.    Lose excess pounds before pregnancy. Doctors don't recommend weight loss during pregnancy. But if you're planning to get pregnant, losing extra weight beforehand may help you have a healthier pregnancy.  Focus on permanent changes to your eating habits. Motivate yourself by remembering the long-term benefits of losing weight, such as a healthier heart, more energy and improved self-esteem.    Preventing Diabetes after Pregnancy:  It is estimated that 35-60 percent of women that have had gestational diabetes will develop type 2 diabetes in the future. It is also thought that children from these pregnancies have a  greater chance of developing obesity and type 2 diabetes.    If you do have prediabetes or have risk factors for having diabetes, research shows that doing just two things can help you prevent or delay type 2 diabetes: Lose 5% to 7% of your body weight, which would be 10 to 14 pounds for a 200-pound person; and get at least 150 minutes each week of physical activity, such as brisk walking.    RESOURCES   You can refer to the Starting Out Right book or find it online at http://www.healthCBTec.org/images/stories/maternity/[facility]-Starting-Out-Right.pdf p  You can sign up for a weekly parenting e-mail that gives support, tips and advice from health care professionals that starts with pregnancy and continues through the toddler years. To register, go to www.[email or web address]/baby at any time during your pregnancy.    Breastfeeding Information:  OUTPATIENT LACTATION RESOURCES    -Schedule a clinic appointment with a [facility] CN with dedicated clinic hours for breastfeeding assistance by calling [phone]. Breastfeeding clinic visits are at [facility] on Wednesdays, [facility] on Tuesdays and [facility] on Thursdays.     -Baby Café    Pregnant and interested in breastfeeding?  Need answers to breastfeeding questions?  Want to help breastfeeding moms?  Already breastfeeding and want to meet other moms?    Join us at the Baby Café!    Baby Cafe is a free, drop-in service offering breast-feeding support for pregnant women, breast-feeding mothers and their families.  Come share tips and socialize with other mothers.  Babies and siblings are welcome (no childcare available).    Starting April 2018, Baby Café will be at 4 locations.  Please see below for the Baby Café closest to you!      [facility]  [address] [place] [address]  1st Wednesday: 10am-12pm    [address] [address]  3rd Wednesday  4-6pm    [facility]  [address] [place] [address]  4th Wednesday 10am-12:30pm    [facility]  [address] [place] [address]  4th Wednesdays: 4-6pm      Hmong, Japanese, and Omani which is may be available at some sites.    For more information, please contact: [email or web address]. or [phone]    -Attend a baby weigh in at [facility].  Lactation consultants are available to answer questions  [place]: Tuesdays 1:00 - 2:00  [facility], [facility]: Mondays 1:00 - 2:00   www.Pipefish    -Attend one of the New Mama groups at [facility] in [facility].  [facility] also offers one-on-one in home and in office lactation consults.   www.Safello    -Attend a LeLeche League meeting.  Multiple groups in several locations throughout the [facility]. The meetings are no-cost and always informative breastfeeding education session through [employer] La Leche League  Www.lllondas.org/  Medication use while breastfeeding: http://toxnet.nlm.nih.gov/newtoxnet/lactmed.htm     Childbirth and Parenting Education:   [facility]: http://[facility]/   [phone]  [place]: (education, yoga & wellness) www.[employer]  [facility]: www.[facility]   Childbirth collective: (Parent topic nights)  www.childbirthcollective.org/  Hypnobabies:  www.hypnobabiestwincities.com/  Hypnobirthing:  Http://hypnobirthing.com/  The Birth Hour: https://thebirthhour.[place]/online-childbirth-class/    APPS and Podcasts:   Ovia  Glow Nurture  The Birth Hour (for birth stories)   Birthful   Expectful   The Longest Shortest Time  PregnancyPodcast Jany Calderon    Book Recommendations:   Rufina Olsburg's Birthing From Within--first few chapters include a new-age tone, you may prefer to skip it and keep going, because there is good stuff later.  This book recommendation covers emotional preparation, but does cover coping  "with pain, and use of both pharmacological and nonpharmacological methods.    Dr. Gao' The Pregnancy Book and The Birth Book--the pregnancy book goes month-by month    Womanly Art of Breastfeeding by La Leche League International   Bestfeeding by Tyra Robert--great pictures    Mothering Your Nursing Toddler, by Stacey Gonzalez.   Addresses dealing with so many of the challenging behaviors of a nursing toddler.  How Weaning Happens, by La Leche League.  Discusses weaning at all ages, from medically necessary weaning of an infant, all the way up to age 5 (or older), with why/why not, and strategies.  Very empowering book both for deciding to wean and deciding not to.    American College of Nurse-Midwives (ACNM) http://www.midwife.org/; look at the informational handouts at http://www.midwife.org/Share-With-Women     www.mymidwife.org    Mother to Baby (Medication and Herbal guidance in pregnancy): http://www.mothertobaby.org  Toll-Free Hotline: 557.202.2669  LactMed (Medication use while breastfeeding): http://toxnet.nlm.nih.gov/newtoxnet/lactmed.htm    Women's Health.gov:  http://www.womenshealth.gov/a-z-topics/index.html    American pregnancy association - http://americanpregnancy.org    Centering Pregnancy (group prenatal care option): http://centeringhealthcare.org    Information about doulas:  Childbirth collective: http://www.childbirthcollective.org/  Doulas of [place] (ABDELRAHMAN):  www.abdelrahman.org  [facility]  project: http://twincitiesdoulaproject.com/     Early Childhood and Family Education (ECFE):  ECFE offers parents hands-on learning experiences that will nourish a lifetime of teachable moments.  http://ecfe.info/ecfe-home/    March of Dimes www.ActionFlow.[place]zella - Nutrition  www.mypyramid.gov  Under \"For Consumers,\" click on \"pregnant and breastfeeding women.\"      Centers for Disease Control and Prevention (CDC) - Vaccines : http://www.cdc.gov/vaccines/       When researching " information on the web, question the validity of websites.  The domains .gov, .edu and.org tend to be more reliable information.  If there are a lot of advertisements, be cautious of the information provided. Stay away from blogs and chat rooms please!

## 2021-06-12 NOTE — PROGRESS NOTES
"Vero Lima is a 20 y.o. female who is being evaluated via a billable telephone visit.      The patient has been notified of following:     \"This telephone visit will be conducted via a call between you and your physician/provider. We have found that certain health care needs can be provided without the need for a physical exam.  This service lets us provide the care you need with a short phone conversation.  If a prescription is necessary we can send it directly to your pharmacy.  If lab work is needed we can place an order for that and you can then stop by our lab to have the test done at a later time.    Telephone visits are billed at different rates depending on your insurance coverage. During this emergency period, for some insurers they may be billed the same as an in-person visit.  Please reach out to your insurance provider with any questions.    If during the course of the call the physician/provider feels a telephone visit is not appropriate, you will not be charged for this service.\"    Patient has given verbal consent to a Telephone visit? Yes    What phone number would you like to be contacted at? [phone]    Patient would like to receive their AVS by AVS Preference: Tania.    Additional provider notes: see below    Phone call duration: 20 minutes    Claudette Navarro LPN    Vero presents alone via telephone for a routine PNV at 15w 3d.  Feeling Pain in back, legs, and buttocks, aching in lower back radiating to sacrum/ numbness and aching in legs.  Improved with rest, worse after standing for long periods such as after work, cooking, cutting hair.  Experienced 5 days out of the last 2 weeks. Pain will radiate down legs at times, sometimes down one or the other or both.  Able to walk but with limp s/t sacral pain.  Likely SI joint dysfunction/sciatic nerve pain.  Open to physical therapy: referral placed.  Needs help with transportation.  Wanting provider note for , written and sent " "to patient via [employer].  Pregnancy has otherwise been \"wonderful\", no pain, no bleeding, stopped taking Zofran (no longer having any N/V)   RTC 5 weeks for anatomy scan, sooner as needed.  Has CNM numbers and aware to call with any questions or concerns.    Renetta SHETH, CRISTOBAL, CLC  10/16/2020 2:28 PM      "

## 2021-06-13 NOTE — TELEPHONE ENCOUNTER
TC: Vero called to review US results with her over the phone. Returned call. LMTC.  Dolores DOMENIC Beth,CRISTOBAL    Vero returned my call and was happy to hear the US result is normal. Additionally she is asking what the gender of the baby is. Called [facility] and spoke with radiologist Dr. Rico who states the notes indicate the baby is male. Informed Vero of this.     Dolores DOMENIC Beth,CRISTOBAL      EXAM DATE:         11/16/2020     EXAM: US OB COMPLETE  [place] [facility] [place]: 11/16/2020 3:00 PM     INDICATION: anatomy ultrasound  COMPARISON: First trimester obstetric ultrasound 08/18/2020  TECHNIQUE: Routine.     FINDINGS:  Single intrauterine gestation, longitudinal vertex presentation. Placenta is located right anterior. Amniotic fluid is normal. Uterus is normal. Maternal adnexa (right and left ovaries) show no abnormalities.     FETAL ANOMALY SCREEN:  Normal fetal survey. Specifically, normal posterior fossa, lateral ventricles, spine, stomach, bladder, kidneys, cord insertion, three-vessel cord, four-chamber heart, outflow tracts, extremities, face, and diaphragms.     BIOMETRY:  Biparietal Diameter: 5.0 cm, 21 weeks, 1 days  Head Circumference: 17.8 cm, 20 weeks, 2 days  Abdominal Circumference: 14.5 cm, 19 weeks, 6 days  Femur Length: 3.4 cm, 20 weeks, 6 days     Estimated Fetal Weight: 343 g  EFW Percentile: 50%     Fetal Heart Rate: 174 bpm  Cervical Length: 3.4 cm  Distance from Placenta to Cervix: 6.8 cm     EDC by First US exam: 4/6/21  EDC by This US exam: 4/1/21     Composite Age by First US: 19 weeks, 6 days  Composite Age by This US: 20 weeks, 4 days     IMPRESSION:     1.  Single living intrauterine gestation.  2.  Based on first ultrasound, composite age of 19 weeks, 6 days with EDC 04/06/2021.  3.  Normal interval growth.  4.  Normal fetal survey.

## 2021-06-13 NOTE — PATIENT INSTRUCTIONS - HE
[facility] Contact information:  Appointment line and to get a hold of CNM in clinic Monday-Friday 8 am - 5 pm:  [phone].  There are some clinics with early start times (1st appointment 7:40 am) and others with evening hours (last appointment 6:20 pm).  Most are typically open from 8 am to 5 pm.    CNM on call answering service: [phone].  Specify your hospital of choice and leave a brief message for CNM;  will then page CNM who is on call at your specified hospital and you should receive a call back with 15 minutes.  Be sure that your ringer is audible and that you can accept blocked calls so that we can get back in touch with you! This number should be reserved for urgent needs if during the day, before 8 am, after 5 pm, weekends, holidays.    Contact the on-call CNM with warning signs, such as:    vaginal bleeding     Vaginal discharge and itching or pain and burning during urination    Leg/calf pain or swelling on one side    severe abdominal pain    nausea and vomiting more than 4-5 times a day, or if you are unable to keep anything down    fever more than 100.4 degrees F.          You are invited to  Meet the [facility]    A way to tour the hospital Labor and Delivery unit and meet the midwives in our group was postponed at the start of hospital restrictions following COVID-19. We will resume these when able and virtual options may be available in the future.     Please call [phone] for ongoing updates.      UNDERSTANDING  LABOR    Going into labor before your 37th week of pregnancy is called  labor.  labor can cause your baby to be born too soon. This can lead to a number of health problems that may affect your baby. From 28-35 weeks, Patients are advised to be evaluated at [facility] since they have a  Intensive Care Unit (NICU).  -Before labor, the cervix is thick  and closed.  -In  labor, the cervix begins to efface (thin) and dilate (open) over a short period of time    Symptoms of  Labor  If you believe you re having  labor, contact the midwives right away. But contractions alone don t mean you re in  labor. What matters more are changes in your cervix (the lower end of the uterus). Symptoms of  labor include:    five or more contractions per hour    Strong & frequent contractions    Constant menstrual-like cramping    Low-back pain    Mucous or bloody vaginal discharge    Bleeding or spotting in the second or third trimester    Evaluating  Labor  Your midwife will try to find out whether you re in  labor or whether you re just having contractions.She may watch you for a few hours. The following tests may be done:    Pelvic exam to see if your cervix has effaced (thinned) and dilated (opened)    Uterine activity monitoring to detect contractions    Fetal monitoring to check the health of your baby    Ultrasound to check your baby s size and position    Caring for Yourself At Home  If you have contractions  but your cervix is still thick and closed, the midwife may ask you to do the following at home:    Drink plenty of water.    Do fewer activities.    Rest in bed on your side.    Avoid intercourse and nipple stimulation.    When to Call Your Midwife    Five or more contractions per hour    Bag of water breaks    Bleeding or spotting     If You Need Hospital Care   labor often requires that you have hospital care and complete bed rest. You may have an IV (intravenous) line to get fluids. And you may be given pills or an injection to help prevent contractions. Finally, you may receive medication (corticosteroids) that helps your baby s lungs mature more quickly.    Are You At Risk?  Any pregnant woman can have  labor. It may start for no reason. But these risk factors can increase your chances:    Past   labor or past early birth    Smoking and drug or alcohol use during pregnancy    Multiple fetuses (twins or more)    Problems with the shape of the uterus    Bleeding during the pregnancy    The Dangers of  Birth  A baby born too soon may have health problems. This is because the baby didn t have enough time to mature. The baby then is at risk of:    Not breastfeeding well    Having immature lungs    Bleeding in the brain    Dying    Reaching Term  Your goal is to get as close to term as you can before giving birth. The closer you get to term, the higher your chance of having a healthy baby. Work with your healthcare provider. Together, you can take steps that may keep you from giving birth too early.        Testing for Gestational Diabetes in Pregnancy  [facility] are committed to providing safe care during your pregnancy. We follow the recommendations of the American Diabetes Association and the American College of Obstetricians and Gynecologists to test all pregnant women for gestational diabetes. Testing early in pregnancy (if you have risk factors) and testing all women between 26-28 weeks follows local and national guidelines for care during pregnancy. Clients who feel that they cannot consent to such testing, may choose to transfer their care to our consultant obstetricians.  What is the test?  Eat normally on the day of the test; a diet rich in protein, whole grains, and vegetables would be best. Avoid simple sugars, white flour products and juices prior to testing.  You will be asked to drink a 10 oz glucose beverage (50 gm, about the same as a can of root beer).  The product is not carbonated as it has to be consumed within 5 minutes. During the next hour, you are seen for a prenatal visit, but are asked to limit your activity around the clinic. Feel free to bring a book or your computer.   Any level less than 140 for this  glucose challenge test  is considered normal. If  measured at 140 to 185, the diagnostic test, a  3 hour glucose tolerance test  will be conducted. If 2 or more readings are abnormal, the diagnosis of gestational diabetes is confirmed and a referral to a diabetes educator will be made. If the level is 185 or above, this confirms the diagnosis and a referral will be made without administering the 3 hour test.  A fasting blood sugar may be performed sometime in the first trimester if you have risk factors for the development of gestational diabetes such as a previous diagnosis or birth of a large baby or a close family relative with diabetes.  What is gestational diabetes?  Your body converts what you eat into glucose. In response to rising blood sugar levels it secretes insulin in order to be able to utilize that glucose. During pregnancy as the placenta grows and matures, it secretes hormones that are necessary to assure baby s growth and development, however, they also reduce the action of insulin in the mother. In some cases too much insulin is blocked (this is called  insulin resistance ) and blood sugar in the mother rises above a normal level. Pregnant women who have never had diabetes before but who have high blood sugar (glucose) levels during pregnancy are said to have gestational diabetes. Gestational diabetes affects about 4-7% of all pregnancies.  What if I have gestational diabetes?  If either of the tests for gestational diabetes show that you have this condition, a referral will be made to visit with the diabetes educator. The educator will help you to make wise food choices, count carbohydrates, monitor your blood sugar and record your levels in a journal. We ask that you bring this diary with you at each prenatal visit. You may meet with the educator several times during the remainder of your pregnancy.  How gestational diabetes can affect your baby  Some mothers may wonder why testing for GDM is delayed until the early part of the 3rd trimester for  most women. Gestational diabetes has an impact on the baby at the time of rapid body growth. When the mother s blood has elevated sugar levels, extra glucose crosses the placenta causing the baby to have excess weight gain ( macrosomia ). Some babies are too big to be born vaginally so their mother must have  births. Babies of mothers with uncontrolled gestational diabetes may have difficult births that can cause trauma to the mother and in rare circumstances, babies may suffer fractures or oxygen deprivation during birth. They may have difficulty maintaining their own blood sugar after birth and they may also have trouble adapting to life outside. Recent research indicates that babies of mothers with uncontrolled or undiagnosed gestational diabetes are at risk for obesity and type 2 diabetes. Women who develop gestational diabetes are more likely to develop type 2 diabetes within 15 years after their pregnancy.  Additional Information  The American College of Nurse Midwives (ACNM) provides an information sheet describing diabetes screening in pregnancy: http://www.womensdocs.com/que/pdf/Second_Trimester/Gestational_Diabetes.pdf    You can visit the American Diabetes Association website http://www.diabetes.org for additional information and to purchase their book,  Gestational Diabetes: What to Expect .    A brochure from the American College of Obstetrics and Gynecology is available at:   http://www.acog.org/~/media/For%20Patients/key682.pdf?dmc=1&nq=[id number]  Testing for gestational diabetes is a critical part of your prenatal journey. Thank you for taking good care of yourself and your baby.    HEALTHY PREGNANCY CARE: 22-26 WEEKS PREGNANT    You are finishing your second trimester. Your baby is developing rapidly. At this stage, babies have a sense of balance, can respond to touch, and are recognizing parent voices.  Your baby will be moving around more now.  You may notice Rapides-Brito  contractions now, which are painless and prepare the uterus for the delivery.    Nutrition: During this time, you may find that your nausea and fatigue are gone. Overall, you may feel better and have more energy than you did in your first trimester. Be sure you are getting enough calcium and iron in your diet. Your prenatal vitamins cannot supply all of the nutrients you need, so continue to eat 3-4 servings of dairy foods and 2-3 servings of meat/fish/poultry/nuts every day. Foods high in iron include: red meats, eggs, dark green vegetables, dark yellow vegetables, nuts, kidney beans and chickpeas. Some cereals are fortified with iron, so look at the food labels for 100% of the daily requirement for iron.     Discuss your work situation with your midwife or physician as needed. If you stand for long periods of time, you may need to make changes and take breaks.    Maybeury for childbirth and parenting classes, including an infant CPR class. Breastfeeding classes are recommended too.    Childbirth and Parenting Education:   [facility] center: http://[employer].Sierra Kings HospitalCopiun/   (635) 608-BABY  [place]: (education, yoga & wellness) www.Reclog  Enlightened Mama: www.Activ TechnologiesenedValant [employer] Solutions.j-Grab   Childbirth collective: (Parent topic nights)  www.childbirthcollective.org/  Hypnobabies:  www.hypnobabiestwincities.com/  Hypnobirthing:  Http://hypnobirthing.com/  The Birth Hour: https://thebirthVeriCorder Technology.j-Grab/online-childbirth-class/    APPS and Podcasts:   Ovia  Glow Nurture  The Birth Hour (for birth stories)   Birthful   Expectful   The Longest Shortest Time  PregnancyPodcast Jany Calderon    Book Recommendations:   Rufina Javier's Birthing From Within--first few chapters include a new-age tone, you may prefer to skip it and keep going, because there is good stuff later.  This book recommendation covers emotional preparation, but does cover coping with pain, and use of both pharmacological and nonpharmacological methods.      "Sears' The Pregnancy Book and The Birth Book--the pregnancy book goes month-by month    Womanly Art of Breastfeeding by La Leche League International   Bestfeeding by Tyra Robert--great pictures    Mothering Your Nursing Toddler, by Stacey Gonzalez.   Addresses dealing with so many of the challenging behaviors of a nursing toddler.  How Weaning Happens, by La Leche League.  Discusses weaning at all ages, from medically necessary weaning of an infant, all the way up to age 5 (or older), with why/why not, and strategies.  Very empowering book both for deciding to wean and deciding not to.    American College of Nurse-Midwives (ACNM) http://www.midwife.org/; look at the informational handouts at http://www.midwife.org/Share-With-Women     www.mymidwife.org    Mother to Baby (Medication and Herbal guidance in pregnancy): http://www.mothertobaby.org  Toll-Free Hotline: 975.141.9355  LactMed (Medication use while breastfeeding): http://toxnet.nlm.nih.gov/newtoxnet/lactmed.htm    Women's Health.gov:  http://www.womenshealth.gov/a-z-topics/index.html    American pregnancy association - http://americanpregnancy.org    Centering Pregnancy (group prenatal care option): http://centeringhealthcare.org    Information about doulas:  Childbirth collective: http://www.childbirthcollective.org/  Doulas of [place] (ABDELRAHMAN):  www.abdelrahman.org  [facility]  project: http://twincitiesdoulaproject.com/     Early Childhood and Family Education (ECFE):  ECFE offers parents hands-on learning experiences that will nourish a lifetime of teachable moments.  http://ecfe.info/ecfe-home/    March of Dimes www.[employer].SystemsNet - Nutrition  www.mypyramid.gov  Under \"For Consumers,\" click on \"pregnant and breastfeeding women.\"      Centers for Disease Control and Prevention (CDC) - Vaccines : http://www.cdc.gov/vaccines/       When researching information on the web, question the validity of websites.  The [employer] .HighScore House, .edu " and.org tend to be more reliable information.  If there are a lot of advertisements, be cautious of the information provided. Stay away from blogs and chat rooms please!    How can you care for yourself at home?   You can refer to the Starting Out Right book or find it online at http://www.healtheast.org/images/stories/maternity/[facility]-Starting-Out-Right.pdf or http://www.healtheast.org/images/stories/flipbooks/[facility]-starting-out-right/[facility]-starting-out-right.html#p=8     You can sign up for a weekly parenting e-mail that gives support, tips and advice from health care professionals that starts with pregnancy and continues through the toddler years. To register, go to www.healthMutracx.org/baby at any time during your pregnancy.      Baby Feeding in the Hospital: Information, Support and Resources    As you prepare for the birth of your child, you will want to consider options for feeding your baby including breast-feeding and/or baby formula. The American Academy of Pediatrics recommends exclusive breast-feeding for the first six months (although any amount of breast-feeding is beneficial).  However, we also understand that breast-feeding is a personal choice and not for everyone. Whether or not you choose to breast-feed, your decision will be respected by our staff.    There are numerous benefits of breast-feeding; here are a few to consider:    Provides antibodies to protect your baby from infections and diseases    The cost: formula can cost over $1,500 per year    Convenience, no warming up or sterilizing bottles and supplies    The physical contact with breastfeeding can make babies feel secure, warm and comforted    What ever my feeding choice, what can I expect after I deliver my baby?    Your baby will usually be placed skin-to-skin immediately following birth. The skin to skin contact between you and your baby will be a special and memorable time. The bonding and attachment comforts your baby  and has a positive effect on baby s brain development.     Having your baby  room in  with you also helps you start to learn your baby s body rhythms and sleep cycle.      You will also begin to learn your baby s cues (signals) that he or she is ready to feed.    When do I start to feed my baby?  As soon as possible after your baby s birth, you will be encouraged to begin feeding.  In the first couple of weeks, your baby will eat often.  Breastfeeding babies usually eat at least 8 times in 24 hours.  Babies fed formula usually eat at least 7 times in 24 hours.      Breast-feeding tips:    Get comfortable and use pillows for support.    Have your baby at the level of your breast, facing you,  tummy to tummy .      Touch your nipple to your baby s lips so you baby s mouth opens wide (rooting reflex).  Aim the nipple toward the roof of your baby s mouth. When your baby opens his or her mouth, pull your baby toward your breast to help your baby  latch on  to your nipple and much of the areola area.    Hand expressing your breast milk can assist with latching your baby to your breast, if needed.    Ask for help, breastfeeding may seem awkward or uncomfortable at first, this is normal. There are numerous resources available at [facility], Clinics and beyond.     If your goal is to exclusively breastfeed, avoid using any formula or artificial nipples (including bottles and pacifiers) while you are your baby are learning to breastfeed unless there is a medical reason.       Mixing breastfeeding and formula can interfere with how you begin building your milk supply.  It can impact how you and your baby  learn  to breastfeeding together and alter the natural growth of  good  bacteria in your baby s stomach.    Delay a pacifier or a bottle in the first few weeks until breastfeeding is well established. This is often around 3 weeks of age.    Ask your nurse to show you how to hand express.   Breast milk can be kept in  the refrigerator or freezer for later use.        Touring the Maternity Care Center  At this time we are offering a virtual tour of the Maternity Care Centers at both [facility] and [facility]:   [facility]: https://www.Mythos.org/Locations/[facility]/Maternity-Care-Center  [facility]:   https://Mythos.fotopedia/overarching-care/the-birthplace/tours  https://www.Mythos.fotopedia/Locations/[facility]/Maternity-Care-Center/#virtual_tour  When in person tours become available, registrations is required. To schedule a tour at either [facility] or [facility], please do so online using the following links:  [facility] - https://www.Secpanel/registerlist.asp?s=6&m=303&vs=5&p=2&bhgen=316&ps=1&group=37&it=1&snc=023  [facility] - https://www.Secpanel/registerlist.asp?s=6&m=303&vs=5&p=2&fydth=978&ps=1&group=38&it=1&yzv=807    Hospital and Clinic  Resources:  -Schedule an appointment with a [facility]   CNM who is also a Lactation Consultant by calling [phone]     -Schedule a clinic appointment with a [facility] CNM with dedicated clinic hours for breastfeeding assistance by calling [phone]. Breastfeeding clinic visits are at [facility] on Mondays, [facility] on Tuesdays and [facility] on Thursdays.     Baby Café    Pregnant and interested in breastfeeding?  Need answers to breastfeeding questions?  Want to help breastfeeding moms?  Already breastfeeding and want to meet other moms?    Join us at the Baby Café!    Baby Cafe is a free, drop-in service offering breast-feeding support for pregnant women, breast-feeding mothers and their families.  Come share tips and socialize with other mothers.  Babies and siblings are welcome (no childcare available).    Starting April 2018, Baby Café will be at 4 locations.  Please see below for the Baby Café closest to you!      [place]  [facility]  [address] [place] [address]  1st Wednesday: 10am-12pm    [facility]  [address] [place] [address]  3rd Wednesday 4-6pm    [facility]  [address] [place] [address]  4th Wednesday 10am-12:30pm    [facility]  [address] [place] [address]  4th Wednesdays: 4-6pm      Hmong, Amharic, and Cambodian which is may be available at some sites.    For more information, please contact: [email or web address] or [phone]    -Attend a baby weigh in at [facility].  Lactation consultants are available to answer questions  [place]: Tuesdays 1:00 - 2:00  [facility]: Mondays 1:00 - 2:00  www.[facility] HospitalSelleroutlet.SeeChange Health    -Attend one of the New Mama groups at [facility] in [facility].  [facility] also offers one-on-one in home and in office lactation consults.   www.[facility].SeeChange Health    -Attend a Winnie Moodyague meeting.  Multiple groups in several locations throughout the [facility]. The meetings are no-cost and always informative breastfeeding education session through Internatal La Leche League  Www.jacinto.org/  Medication use while breastfeeding: http://toxnet.nlm.nih.gov/newtoxnet/lactmed.htm     Online Resources:    [facility]ast.org/baby sign up for free online weekly e-mail    [facility]ast.org/maternity    Breastfeedingmadesimple.com    li.org (La Leche League)    Normalfed.com    Womenshealth.gov/breastfeeding    Workandpump.com    Breast-feeding Supplies & Pumps:  Talk to your insurance provider or WIC (Women, Infants and Children) to learn more about options available to you. Recent health insurance changes may include additional coverage for supplies and pumps.    Public Health:  Women, Infants and Children Nutrition program (WIC): provides breast-feeding support and education in addition to formal feeding moms. 315-HUI-5894 or http://www.health.[facility].[place].us/divs/fh/wic    Family  "Health Home Visiting: Public Health Nurse home visits are available. Talk to your provider to see if you qualify. Most [facility] have a program available.    Additional Resources:  La Leche League is an international, nonprofit, nonsectarian organization offering information, education, and support to mothers who want to breast-feed their babies. Local groups offer phone help and monthly meetings. Visit Anonymess.org or SpotMe Fitness.org and us the  Find local support  drop down menu or click on the  Resources  tab.    [place] Breastfeeding Resources: 5-574-147-BABY (2229) toll free    National Breastfeeding Help Line trained breastfeeding peer counselors can help answer common breast-feeding questions by phone. Monday-Friday: English/Occitan  5-035- 679-8791 toll free, 1-234.411.8276 (TTY)    [facility] Connection: 790-249-[facility] (5621)      Virtual Breastfeeding Support:    During this time of isolation, breastfeeding families need even more community!  Here are some area organizations offering virtual support groups for breastfeeding:      Lat Cafe Support Group, Tuesdays at 10:30 am   Run by KIRAN Cosme of [employer] [facility] [facility]act[facility]   Go to [facility] Facebook page and click on \"events\" for link   https://www.Purplu.com/events/[email or web address]/    [facility] Milk Hour, Thursdays at 2:30 pm    Run by KIRAN Gonzalez   Go to [facility] FB page and send message to get link   https://www.Purplu.com/healthfoundations/    [facility] holding virtual meetings the first Tuesday of each month, 8-9 pm, and the   Third Saturday, 10 - 11 am.  Go to [facility] FB page; message to get link https://www.Purplu.com/Nicole/?hc_location=[facility]    [facility] offers a Lactation Lounge every Friday 12pm - 1pm, run by Cherise Pillai, " Elyse Rosario Leader   Sign up via link at Friends Around/cbe-lactation   https://www.Friends Around/cbe-lactation    [place] [facility] is offering virtual support groups every Monday, 10:30 am - 12 pm, run by nurse KIRAN   [email or web address]    Prenatal Breastfeeding Classes:        [employer] is offering virtual breastfeeding and  care classes:  [email or web address]    [employer] childbirth and breastfeeding education offering virtual prenatal breastfeeding classes  [email or web address]

## 2021-06-13 NOTE — PROGRESS NOTES
Here for a routine prenatal visit at 19w6d. Offers no questions or concerns. Had anatomy ultrasound today- report not yet in. Reassured we will send via [employer]. Reports lots of fetal movements. 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 Taking iron supplement as recommended. Safe in relationship. Looking into online CBE options. Mid pregnancy anticipatory guidance given. Daily walks encouraged. Recommended flu shot. Pt declines today. RTC 4 weeks or sooner prn.

## 2021-06-13 NOTE — PROGRESS NOTES
Vero is here for a routine prenatal visit at 24w6d.  Anatomy scan reviewed. Happy to be expecting a boy.   Feels lots of fetal movements.  Denies signs or symptoms of  labor.  Continues to work overnights but is able to sleep intermittently throughout her shift.  Will contact insurance about car seat and  coverage as well as circumcision coverage.  Also discussed [employer] for support with  resources and car seat    Vero is washing her hands frequently, cleaning surfaces at home and limiting social contact to avoid exposure from coronavirus. SHe denies fever, cough, shortness of breath, loss of taste or smell, diarrhea or any contact with anyone with coronavirus-like symptoms.   Reviewed COVID19 hospital policies including limit of one support person while in the hospital, SARS-CoV-2 PCR testing on admission, waterbirth and nitrous an option if SARS-CoV-2 PCR test is negative, no family visiting after the birth, and early discharge if possible.   Reviewed our recommendation that she take an iron supplement daily to boost her iron stores prior to birth as we anticipate a shortage of blood products due to COVID19 pandemic.  She is currently supplementing daily; discussed strategies to manage constipation.   Discussed current recommendations for prenatal appointments with some inperson visits and telephone visits when appropriate.  Reviewed how to reach CNM with non-urgent and urgent concerns between visits    Mid pregnancy anticipatory guidance reviewed.     Enc follow-up in 4 weeks or sooner prn.  Plan GCT, hemoglobin, RPR, Tdap at next visit.

## 2021-06-13 NOTE — PATIENT INSTRUCTIONS - HE
[facility] [place] [facility]  Contact information:  Appointment line and to get a hold of CNM in clinic Monday-Friday 8 am - 5 pm:  [phone].  There are some clinics with early start times (1st appointment 7:40 am) and others with evening hours (last appointment 6:20 pm).  Most are typically open from 8 am to 5 pm.    CNM on call answering service: [phone].  Specify your hospital of choice and leave a brief message for CNM;  will then page CNM who is on call at your specified hospital and you should receive a call back with 15 minutes.  Be sure that your ringer is audible and that you can accept blocked calls so that we can get back in touch with you! This number should be reserved for urgent needs if during the day, before 8 am, after 5 pm, weekends, holidays.    Contact the on-call CNM with warning signs, such as:    vaginal bleeding     Vaginal discharge and itching or pain and burning during urination    Leg/calf pain or swelling on one side    severe abdominal pain    nausea and vomiting more than 4-5 times a day, or if you are unable to keep anything down    fever more than 100.4 degrees F.      You are invited to  Meet the [facility] [place] [facility]    Virtual:   [email or web address]://www.[email or web address].org/classes-and-[email or web address]/meet-the-midwives-from-[facility]    A way to tour the hospital Labor and Delivery unit and meet the midwives in our group was postponed at the start of hospital restrictions following COVID-19. We will resume these when able.    Please call [phone] for ongoing updates.        Touring the Maternity Care Center  At this time we are offering a virtual tour of the Maternity Care Centers at both [facility] and [facility]:   [facility]: https://www.Marinelayer.org/Locations/Czcvxtblz-Vvbeqt-Vjqrms/Maternity-Care-Center  [place]:    https://Engine Yard.org/overarching-care/the-birthplace/tours  https://www.[employer].org/Locations/[facility]/Maternity-Care-Center/#virtual_tour  When in person tours become available, registrations is required. To schedule a tour at either [facility] or [facility], please do so online using the following links:  [facility] - [email or web address].com/[email or web address]&[email or web address]&[email or web address]&[email or web address]&group=37&it=1&avc=745  [facility] - [email or web address].Competitor/[email or web address]&[email or web address]&[email or web address]&[email or web address]&[email or web address]&it=1&onf=768       DAILY FETAL MOVEMENT COUNTING    One of the best ways to keep track of a healthy baby is to notice its movements. Healthy babies are very active. However, some perfectly normal babies may sleep quietly as long as 60 minutes without moving. Babies who are having problems are sluggish and move less. Counting these movements can provide your nurse-midwife with a warning of developing problems.    You should begin this counting at the around your 7th to 8th month of your pregnancy (28-32 weeks) if you are ever concerned that there is less movement than normal for your baby.     INSTRUCTIONS    1.  If able, drink 2 glasses of fluid and lie down on one side.  Put your hand on your abdomen.  2. Count 10 separate times that the baby moves. A movement may be a kick, turn, or flip of the baby.  3.  Record the time you feel the 10th movement. When you count the 10th movement, stop counting.  4.  If one hour passes with less than 10 movements, drink a large glass of fruit juice. Then count for the another hour. If you do not reach 10 movements, call the midwives    REMEMBER      The baby may move all 10 times in an hour or less.    The baby may take up to five hours to move 10 times.    The important thing is to know what is normal for your baby so you can tell your nurse-midwife when something different is happening.    CALL THE NURSE-MIDWIFE  IF:      You do not feel 10 movements in five hours.    You have not felt the baby move all day.    It is taking longer and longer each day to get the 10th movement.      Pregnancy: Your Third Trimester Changes  How You Are Changing  Your body is preparing for the birth of your baby. Some of the most common changes are listed below. If you have any questions or concerns, ask your midwife.    You ll gain more weight from fluids, extra blood, and fat deposits. Your baby will gain an average of an ounce per day (a half a pound a week)!    Your breasts will grow as your body gets ready to feed the baby. They may be more tender. You may also notice a slight yellow or white discharge from the nipples.    Discharge from your vagina may increase. This is normal.    You might see some skin color changes on your forehead, cheeks, or nose. Most of these will go away after you deliver.  How Your Baby Is Growing    Month 7  Your baby is about 14 inches long. If born prematurely (too early), your baby would likely survive with special care.   Month 8  Your baby is building up body fat. Baby kicks strongly, but is getting too big to move around much.   Month 9  Your baby weighs nearly 7 pounds and is about 18-20 inches long. In other words, any day now...       UNDERSTANDING  LABOR    Going into labor before your 37th week of pregnancy is called  labor.  labor can cause your baby to be born too soon. This can lead to a number of health problems that may affect your baby. From 28-35 weeks, Patients are advised to be evaluated at [facility] since they have a  Intensive Care Unit (NICU).  -Before labor, the cervix is thick and closed.  -In  labor, the cervix begins to efface (thin) and dilate (open) over a short period of time    Are You At Risk?  Any pregnant woman can have  labor. It may start for no reason. But these risk factors can increase your chances:    Past   labor or past early birth    Smoking and drug or alcohol use during pregnancy    Multiple fetuses (twins or more)    Problems with the shape of the uterus    Bleeding during the pregnancy    The Dangers of  Birth  A baby born too soon may have health problems. This is because the baby didn t have enough time to mature. The baby then is at risk of:    Not breastfeeding well    Having immature lungs    Bleeding in the brain    Dying    Reaching Term  Your goal is to get as close to term as you can before giving birth. The closer you get to term, the higher your chance of having a healthy baby. Work with your healthcare provider. Together, you can take steps that may keep you from giving birth too early.    Symptoms of  Labor  If you believe you re having  labor, contact the midwives right away. But contractions alone don t mean you re in  labor. What matters more are changes in your cervix (the lower end of the uterus).   Symptoms of  labor include:    five or more contractions per hour    Strong & frequent contractions    Constant menstrual-like cramping    Low-back pain        Mucous or bloody vaginal discharge    Bleeding or spotting in the second or third trimester    Evaluating  Labor  Your midwife will try to find out whether you re in  labor or whether you re just having contractions.She may watch you for a few hours. The following tests may be done:    Pelvic exam to see if your cervix has effaced (thinned) and dilated (opened)    Uterine activity monitoring to detect contractions    Fetal monitoring to check the health of your baby    Ultrasound to check your baby s size and position    Caring for Yourself At Home  If you have contractions  but your cervix is still thick and closed, the midwife may ask you to do the following at home:    Drink plenty of water.    Do fewer activities.    Rest in bed on your side.    Avoid intercourse and nipple  stimulation.    When to Call Your Midwife    Five or more contractions per hour    Bag of water breaks    Bleeding or spotting     If You Need Hospital Care   labor often requires that you have hospital care and complete bed rest. You may have an IV (intravenous) line to get fluids. And you may be given pills or an injection to help prevent contractions. Finally, you may receive medication (corticosteroids) that helps your baby s lungs mature more quickly.    Syphilis Screening:   The [place] Department of Health ([facility]) provided new recommendations for screening during pregnancy. If you are pregnant, [facility] recommends testing three times during your pregnancy: at your first visit, 28 weeks, and after delivery.   Syphilis is:     Caused by a bacteria spread by sexual contact    Rising among [place] women of child-bearing age  Syphilis can:     Be passed on to infants during pregnancy or during delivery and can be life threatening    Be cured. There are ways to protect yourself and your babies.        HEALTHY PREGNANCY CARE: 26-30 WEEKS PREGNANT    You are now in your last trimester of pregnancy. Your baby is growing rapidly, can open and close her eyelids, sometimes get hiccups, and you'll probably feel her moving around more often. Your baby has breathing movements and is gaining about one ounce each day. You may notice heartburn and leg cramps. Your back may ache as your body gets used to your baby's size and length.    Discuss your work situation with your midwife or physician as needed. If you stand for long periods of time, you may need to make changes and take breaks.    Pre-register after 30 weeks online at the hospital where your baby will be born [email or web address]      Be aware of your baby's activity level. You may be asked to do daily fetal movement counts. Contact your midwife or physician about any decreased movement.    You may have been tested for gestational  diabetes today. If you are RH negative, you may have had an additional test and a Rhogam injection.    Consider receiving a Tdap vaccination to protect your baby from Pertussis/whooping cough.    Baby Feeding in the Hospital: Information, Support and Resources    As you prepare for the birth of your child, you will want to consider options for feeding your baby including breast-feeding and/or baby formula. The American Academy of Pediatrics recommends exclusive breast-feeding for the first six months (although any amount of breast-feeding is beneficial).  However, we also understand that breast-feeding is a personal choice and not for everyone. Whether or not you choose to breast-feed, your decision will be respected by our staff.    There are numerous benefits of breast-feeding; here are a few to consider:    Provides antibodies to protect your baby from infections and diseases    The cost: formula can cost over $1,500 per year    Convenience, no warming up or sterilizing bottles and supplies    The physical contact with breastfeeding can make babies feel secure, warm and comforted    What ever my feeding choice, what can I expect after I deliver my baby?    Your baby will usually be placed skin-to-skin immediately following birth. The skin to skin contact between you and your baby will be a special and memorable time. The bonding and attachment comforts your baby and has a positive effect on baby s brain development.     Having your baby  room in  with you also helps you start to learn your baby s body rhythms and sleep cycle.      You will also begin to learn your baby s cues (signals) that he or she is ready to feed.    When do I start to feed my baby?  As soon as possible after your baby s birth, you will be encouraged to begin feeding.  In the first couple of weeks, your baby will eat often.  Breastfeeding babies usually eat at least 8 times in 24 hours.  Babies fed formula usually eat at least 7 times in 24  hours.      Breast-feeding tips:    Get comfortable and use pillows for support.    Have your baby at the level of your breast, facing you,  tummy to tummy .      Touch your nipple to your baby s lips so you baby s mouth opens wide (rooting reflex).  Aim the nipple toward the roof of your baby s mouth. When your baby opens his or her mouth, pull your baby toward your breast to help your baby  latch on  to your nipple and much of the areola area.    Hand expressing your breast milk can assist with latching your baby to your breast, if needed.    Ask for help, breastfeeding may seem awkward or uncomfortable at first, this is normal. There are numerous resources available at [facility]ics and beyond.     If your goal is to exclusively breastfeed, avoid using any formula or artificial nipples (including bottles and pacifiers) while you are your baby are learning to breastfeed unless there is a medical reason.       Mixing breastfeeding and formula can interfere with how you begin building your milk supply.  It can impact how you and your baby  learn  to breastfeeding together and alter the natural growth of  good  bacteria in your baby s stomach.    Delay a pacifier or a bottle in the first few weeks until breastfeeding is well established. This is often around 3 weeks of age.    Ask your nurse to show you how to hand express.   Breast milk can be kept in the refrigerator or freezer for later use.    Hospital and Clinic  Resources:  -Schedule an appointment with a [facility] CNM who is also a Lactation Consultant by calling [phone]     -Schedule a clinic appointment with a [facility] CNM with dedicated clinic hours for breastfeeding assistance by calling [phone]. Breastfeeding clinic visits are at [facility] on Mondays, [facility] on Tuesdays and [facility] on Thursdays.     -Baby Café    Pregnant and interested in breastfeeding?  Need answers to breastfeeding questions?  Want  to help breastfeeding moms?  Already breastfeeding and want to meet other moms?    Join us at the Baby Café!    Baby Cafe is a free, drop-in service offering breast-feeding support for pregnant women, breast-feeding mothers and their families.  Come share tips and socialize with other mothers.  Babies and siblings are welcome (no childcare available).    Starting April 2018, Baby Café will be at 4 locations.  Please see below for the Baby Café closest to you!    [facility]  [address] [place] [address]  1st Wednesday: 10am-12pm    [facility]  [address] [place] [address]  3rd Wednesday 4-6pm    [facility]  [address] [place] [address]  4th Wednesday 10am-12:30pm    [facility]  [address] [place] [address]  4th Wednesdays: 4-6pm      Hmong, Romansh, and Polish which is may be available at some sites.    For more information, please contact: [email or web address]. or [phone]    -Attend a baby weigh in at [facility].  Lactation consultants are available to answer questions  [facility]: Tuesdays 1:00 - 2:00  [facility]: Mondays 1:00 - 2:00  www.[facility] HospitalMassHousing.[employer] Plant Sales    -Attend one of the New Mama groups at [facility] in [facility].  [facility] also offers one-on-one in home and in office lactation consults.   www.[facility]    -Attend a Winnie Rosario meeting.  Multiple groups in several locations throughout the [facility]. The meetings are no-cost and always informative breastfeeding education session through [employer] La Leche League  Www.jacinto.org/  Medication use while breastfeeding: http://toxnet.nlm.nih.gov/newtoxnet/lactmed.htm     Online Resources:    [facility].org/baby sign up for free online weekly e-mail    [facility]ast.org/maternity    Breastfeedingmadesimple.com    Llli.org (La Leche  Abraham)    Normalfed.com    Womenshealth.gov/breastfeeding    Workandpump.com    Breast-feeding Supplies & Pumps:  Talk to your insurance provider or WIC (Women, Infants and Children) to learn more about options available to you. Recent health insurance changes may include additional coverage for supplies and pumps.    Public Health:  Women, Infants and Children Nutrition program (WIC): provides breast-feeding support and education in addition to formal feeding moms. 320-ACW-9497 or http://www.health.[facility].us/divs/fh/wic    Family Health Home Visiting: [facility] Nurse home visits are available. Talk to your provider to see if you qualify. Most [facility] have a program available.    Additional Resources:  La Leche League is an international, nonprofit, nonsectarian organization offering information, education, and support to mothers who want to breast-feed their babies. Local groups offer phone help and monthly meetings. Visit Root4.YouTube or [employer]YouTube and us the  Find local support  drop down menu or click on the  Resources  tab.    [place] Breastfeeding Resources: 2-917-936-BABY (7219) toll free    National Breastfeeding Help Line trained breastfeeding peer counselors can help answer common breast-feeding questions by phone. Monday-Friday: English/North Korean  [phone]- [phone] toll free, 1-870.730.4292 (TTY)    [facility] Connection: [phone] (6121)      Resources:    Childbirth and Parenting Education:   [facility]: http://Asp[facility] Ironwood HospitalSMA Informatics.[employer]ream[employer]/   (619) 536-FUFN  [place]: (education, yoga & wellness) www.FiftyThree.[employer]  Enlightened Mama: www.enlightenedmama.com   Childbirth collective: (Parent topic nights)  www.childbirthcollective.org/  Hypnobabies:  www.hypnobabiestStupil.com/  Hypnobirthing:  Http://hypnobirthing.com/  The Birth Hour: https://Zenring.[employer]/online-childbirth-class/    APPS and Podcasts:   Ovia  Glow Nurture  The Birth Hour (for birth stories)   Birthful   Expectful    The Longest Shortest Time  PregnancyPodcast Jany Calderon    Book Recommendations:   Rufina Yaya's Birthing From Within--first few chapters include a new-age tone, you may prefer to skip it and keep going, because there is good stuff later.  This book recommendation covers emotional preparation, but does cover coping with pain, and use of both pharmacological and nonpharmacological methods.    Dr. Gao' The Pregnancy Book and The Birth Book--the pregnancy book goes month-by month    Womanly Art of Breastfeeding by La Leche League International   Bestfeeding by Tyra Robert--great pictures    Mothering Your Nursing Toddler, by Stacey Gonzalez.   Addresses dealing with so many of the challenging behaviors of a nursing toddler.  How Weaning Happens, by La Leche League.  Discusses weaning at all ages, from medically necessary weaning of an infant, all the way up to age 5 (or older), with why/why not, and strategies.  Very empowering book both for deciding to wean and deciding not to.    American College of Nurse-Midwives (ACNM) http://www.midwife.org/; look at the informational handouts at http://www.midwife.org/Share-With-Women     www.mymidwife.org    Mother to Baby (Medication and Herbal guidance in pregnancy): http://www.mothertobaby.org  Toll-Free Hotline: 288.948.9878  LactMed (Medication use while breastfeeding): http://toxnet.nlm.nih.gov/newtoxnet/lactmed.htm    Women's Health.gov:  http://www.womenshealth.gov/a-z-topics/index.html    American pregnancy association - http://americanpregnancy.org    Centering Pregnancy (group prenatal care option): http://centeringhealthcare.org    Information about doulas:  Childbirth collective: http://www.childbirthcollective.org/  Doulas of [place] (ABDELRAHMAN):  www.abdelrahman.org  [place]  project: http://twincitiesdoulaproject.com/     Early Childhood and Family Education (ECFE):  ECFE offers parents hands-on learning experiences that will nourish a lifetime of  "teachable moments.  http://ecfe.info/ecfe-home/    March of Dimes www.[employer].MMIT     FDA - Nutrition  www.mypyramid.gov  Under \"For Consumers,\" click on \"pregnant and breastfeeding women.\"      Centers for Disease Control and Prevention (CDC) - Vaccines : http://www.cdc.gov/vaccines/       When researching information on the web, question the validity of websites.  The "[employer] (Suzhou Natong)" .gov, Getourguide andIcelandic Glacialorg tend to be more reliable information.  If there are a lot of advertisements, be cautious of the information provided. Stay away from blogs and chat rooms please!             Virtual Breastfeeding Support:    During this time of isolation, breastfeeding families need even more community!  Here are some area organizations offering virtual support groups for breastfeeding:      Latch Cafe Support Group, Tuesdays at 10:30 am   Run by KIRAN Cosme of [employer] [facility]act[facility]   Go to [employer] [facility] [employer]act[employer] Facebook page and click on \"events\" for link   https://www.[employer].com/events/[email or web address]/    [facility] Milk Hour, Thursdays at 2:30 pm    Run by KIRAN Gonzalez   Go to [facility] FB page and send message to get link   https://www.[employer].com/healthfoundations/    Elyse Rosario [facility]/[employer] holding virtual meetings the first Tuesday of each month, 8-9 pm, and the   Third Saturday, 10 - 11 am.  Go to La Leche League [facility] FB page; message to get link https://www.[employer].MMIT/LLLofGoldenValyue/?hc_location=[facility]    [facility] offers a Lactation Lounge every Friday 12pm - 1pm, run by Elyse Mcfadden Leader   Sign up via link at Postcard on the Run/cbe-lactation   https://www.Postcard on the Run/cbe-lactation    [facility] is offering virtual support groups every Monday, 10:30 am - 12 pm, run by nurse " IBCLC   Https://www.Tuebora.[email or web address]/events/[email or web address]/    Prenatal Breastfeeding Classes:        [facility] is offering virtual breastfeeding and  care classes:  https://www.Taggstr/education-workshops    BirthEd childbirth and breastfeeding education offering virtual prenatal breastfeeding classes  https://www.Occasionmn.NPTV/workshops

## 2021-06-14 NOTE — PROGRESS NOTES
Vero Lima is here for JENARO at 30w0d. Doing well overall since her last visit. She is interested in knowing how big her baby is at this time. Fundal height is appropriate for gestational age today, S=D. Per Leopolds maneuvers, CNM estimates baby is about 3 pounds. Fetal movement is active. She notes most activity in the morning and evening. Reviewed weight gain chart with patient: IWG slightly excessive. Discussed daily exercise, heathy nutrition, and water as main beverage, as well as decreasing sugar in diet with goals of an overall healthy weight gain and improved pregnancy and birth outcomes. Vero thinks she was a larger baby but does not know the exact weight. Denies symptoms of bleeding, leaking water, or contractions. 32-week checklist started. Handouts were given and reviewed and she will pre-register. Plans to delivery her baby at [facility]. Reports a poor experience at check in at [facility] in the past. She will work on birth preferences form and bring to next visit for review. Plans circumcision for baby boy. Plans to name him Catina Jackson Reviewed second trimester warning signs including fetal monitoring and PTL precautions and when to call. RTC 2 weeks.    Plan to offer Tdap vaccine at next visit*

## 2021-06-14 NOTE — TELEPHONE ENCOUNTER
Pls call Junie at [facility] re questions about U/S order. Pt coming in soon. Junie in U/S at [phone].
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
## 2021-06-14 NOTE — TELEPHONE ENCOUNTER
Pt called and stated that when she had her previous U/S they did not give her the information they should have. Pt wants another U./S before tomorrow's appt. Which is 1:20 at [facility]. She wants to know fetal weight. Pls call to discuss.

## 2021-06-14 NOTE — PATIENT INSTRUCTIONS - HE
[facility] Nurse Midwives [facility]  Contact information:  Appointment line and to get a hold of CNM in clinic Monday-Friday 8 am - 5 pm:  [phone].  There are some clinics with early start times (1st appointment 7:40 am) and others with evening hours (last appointment 6:20 pm).  Most are typically open from 8 am to 5 pm.    CNM on call answering service: [phone].  Specify your hospital of choice and leave a brief message for CNM;  will then page CNM who is on call at your specified hospital and you should receive a call back with 15 minutes.  Be sure that your ringer is audible and that you can accept blocked calls so that we can get back in touch with you! This number should be reserved for urgent needs if during the day, before 8 am, after 5 pm, weekends, holidays.    Contact the on-call CNM with warning signs, such as:    vaginal bleeding     Vaginal discharge and itching or pain and burning during urination    Leg/calf pain or swelling on one side    severe abdominal pain    nausea and vomiting more than 4-5 times a day, or if you are unable to keep anything down    fever more than 100.4 degrees F.      You are invited to  Meet the [facility] Nurse Midwives [facility]    Virtual:   [email or web address]://[email or web address].org/classes-and-events/meet-the-midwives-from-[facility]    A way to tour the hospital Labor and Delivery unit and meet the midwives in our group was postponed at the start of hospital restrictions following COVID-19. We will resume these when able.    Please call [phone] for ongoing updates.        Touring the Maternity Care Center  At this time we are offering a virtual tour of the Maternity Care Centers at both [facility] and [facility]:   [facility]: https://www.Yantra.org/Locations/Dmlmlqxlg-Wpfxky-Hoklxc/Maternity-Care-Center  [facility]:    https://Mesitis.org/overarching-care/the-birthplace/tours  https://www.Mesitis.org/Locations/[facility]/Maternity-Care-Center/#virtual_tour  When in person tours become available, registrations is required. To schedule a tour at either [place] or [facility], please do so online using the following links:  [facility] - [email or web address].[email or web address].EzyInsights/[email or web address]?s=[email or web address]&[email or web address]&vs=5&p=[email or web address]&dolrx=587&ps=1&group=37&it=1&zda=151  [facility] - [email or web address]://www.MPOWER Mobile/registerlist.asp?s=[email or web address]&[email or web address]&vs=5&p=2&[email or web address]&ps=1&group=38&it=1&hvp=510       DAILY FETAL MOVEMENT COUNTING    One of the best ways to keep track of a healthy baby is to notice its movements. Healthy babies are very active. However, some perfectly normal babies may sleep quietly as long as 60 minutes without moving. Babies who are having problems are sluggish and move less. Counting these movements can provide your nurse-midwife with a warning of developing problems.    You should begin this counting at the around your 7th to 8th month of your pregnancy (28-32 weeks) if you are ever concerned that there is less movement than normal for your baby.     INSTRUCTIONS    1.  If able, drink 2 glasses of fluid and lie down on one side.  Put your hand on your abdomen.  2. Count 10 separate times that the baby moves. A movement may be a kick, turn, or flip of the baby.  3.  Record the time you feel the 10th movement. When you count the 10th movement, stop counting.  4.  If one hour passes with less than 10 movements, drink a large glass of fruit juice. Then count for the another hour. If you do not reach 10 movements, call the midwives    REMEMBER      The baby may move all 10 times in an hour or less.    The baby may take up to five hours to move 10 times.    The important thing is to know what is normal for your baby so you can tell your nurse-midwife when something different is happening.    CALL THE NURSE-MIDWIFE  IF:      You do not feel 10 movements in five hours.    You have not felt the baby move all day.    It is taking longer and longer each day to get the 10th movement.      Pregnancy: Your Third Trimester Changes  How You Are Changing  Your body is preparing for the birth of your baby. Some of the most common changes are listed below. If you have any questions or concerns, ask your midwife.    You ll gain more weight from fluids, extra blood, and fat deposits. Your baby will gain an average of an ounce per day (a half a pound a week)!    Your breasts will grow as your body gets ready to feed the baby. They may be more tender. You may also notice a slight yellow or white discharge from the nipples.    Discharge from your vagina may increase. This is normal.    You might see some skin color changes on your forehead, cheeks, or nose. Most of these will go away after you deliver.  How Your Baby Is Growing    Month 7  Your baby is about 14 inches long. If born prematurely (too early), your baby would likely survive with special care.   Month 8  Your baby is building up body fat. Baby kicks strongly, but is getting too big to move around much.   Month 9  Your baby weighs nearly 7 pounds and is about 18-20 inches long. In other words, any day now...       UNDERSTANDING  LABOR    Going into labor before your 37th week of pregnancy is called  labor.  labor can cause your baby to be born too soon. This can lead to a number of health problems that may affect your baby. From 28-35 weeks, Patients are advised to be evaluated at [facility] since they have a  Intensive Care Unit (NICU).  -Before labor, the cervix is thick and closed.  -In  labor, the cervix begins to efface (thin) and dilate (open) over a short period of time    Are You At Risk?  Any pregnant woman can have  labor. It may start for no reason. But these risk factors can increase your chances:    Past   labor or past early birth    Smoking and drug or alcohol use during pregnancy    Multiple fetuses (twins or more)    Problems with the shape of the uterus    Bleeding during the pregnancy    The Dangers of  Birth  A baby born too soon may have health problems. This is because the baby didn t have enough time to mature. The baby then is at risk of:    Not breastfeeding well    Having immature lungs    Bleeding in the brain    Dying    Reaching Term  Your goal is to get as close to term as you can before giving birth. The closer you get to term, the higher your chance of having a healthy baby. Work with your healthcare provider. Together, you can take steps that may keep you from giving birth too early.    Symptoms of  Labor  If you believe you re having  labor, contact the midwives right away. But contractions alone don t mean you re in  labor. What matters more are changes in your cervix (the lower end of the uterus).   Symptoms of  labor include:    five or more contractions per hour    Strong & frequent contractions    Constant menstrual-like cramping    Low-back pain        Mucous or bloody vaginal discharge    Bleeding or spotting in the second or third trimester    Evaluating  Labor  Your midwife will try to find out whether you re in  labor or whether you re just having contractions.She may watch you for a few hours. The following tests may be done:    Pelvic exam to see if your cervix has effaced (thinned) and dilated (opened)    Uterine activity monitoring to detect contractions    Fetal monitoring to check the health of your baby    Ultrasound to check your baby s size and position    Caring for Yourself At Home  If you have contractions  but your cervix is still thick and closed, the midwife may ask you to do the following at home:    Drink plenty of water.    Do fewer activities.    Rest in bed on your side.    Avoid intercourse and nipple  stimulation.    When to Call Your Midwife    Five or more contractions per hour    Bag of water breaks    Bleeding or spotting     If You Need Hospital Care   labor often requires that you have hospital care and complete bed rest. You may have an IV (intravenous) line to get fluids. And you may be given pills or an injection to help prevent contractions. Finally, you may receive medication (corticosteroids) that helps your baby s lungs mature more quickly.    Syphilis Screening:   The [place] Department of Health ([facility]) provided new recommendations for screening during pregnancy. If you are pregnant, [facility] recommends testing three times during your pregnancy: at your first visit, 28 weeks, and after delivery.   Syphilis is:     Caused by a bacteria spread by sexual contact    Rising among [place] women of child-bearing age  Syphilis can:     Be passed on to infants during pregnancy or during delivery and can be life threatening    Be cured. There are ways to protect yourself and your babies.        HEALTHY PREGNANCY CARE: 26-30 WEEKS PREGNANT    You are now in your last trimester of pregnancy. Your baby is growing rapidly, can open and close her eyelids, sometimes get hiccups, and you'll probably feel her moving around more often. Your baby has breathing movements and is gaining about one ounce each day. You may notice heartburn and leg cramps. Your back may ache as your body gets used to your baby's size and length.    Discuss your work situation with your midwife or physician as needed. If you stand for long periods of time, you may need to make changes and take breaks.    Pre-register after 30 weeks online at the hospital where your baby will be born [email or web address]      Be aware of your baby's activity level. You may be asked to do daily fetal movement counts. Contact your midwife or physician about any decreased movement.    You may have been tested for gestational  diabetes today. If you are RH negative, you may have had an additional test and a Rhogam injection.    Consider receiving a Tdap vaccination to protect your baby from Pertussis/whooping cough.    Baby Feeding in the Hospital: Information, Support and Resources    As you prepare for the birth of your child, you will want to consider options for feeding your baby including breast-feeding and/or baby formula. The American Academy of Pediatrics recommends exclusive breast-feeding for the first six months (although any amount of breast-feeding is beneficial).  However, we also understand that breast-feeding is a personal choice and not for everyone. Whether or not you choose to breast-feed, your decision will be respected by our staff.    There are numerous benefits of breast-feeding; here are a few to consider:    Provides antibodies to protect your baby from infections and diseases    The cost: formula can cost over $1,500 per year    Convenience, no warming up or sterilizing bottles and supplies    The physical contact with breastfeeding can make babies feel secure, warm and comforted    What ever my feeding choice, what can I expect after I deliver my baby?    Your baby will usually be placed skin-to-skin immediately following birth. The skin to skin contact between you and your baby will be a special and memorable time. The bonding and attachment comforts your baby and has a positive effect on baby s brain development.     Having your baby  room in  with you also helps you start to learn your baby s body rhythms and sleep cycle.      You will also begin to learn your baby s cues (signals) that he or she is ready to feed.    When do I start to feed my baby?  As soon as possible after your baby s birth, you will be encouraged to begin feeding.  In the first couple of weeks, your baby will eat often.  Breastfeeding babies usually eat at least 8 times in 24 hours.  Babies fed formula usually eat at least 7 times in 24  hours.      Breast-feeding tips:    Get comfortable and use pillows for support.    Have your baby at the level of your breast, facing you,  tummy to tummy .      Touch your nipple to your baby s lips so you baby s mouth opens wide (rooting reflex).  Aim the nipple toward the roof of your baby s mouth. When your baby opens his or her mouth, pull your baby toward your breast to help your baby  latch on  to your nipple and much of the areola area.    Hand expressing your breast milk can assist with latching your baby to your breast, if needed.    Ask for help, breastfeeding may seem awkward or uncomfortable at first, this is normal. There are numerous resources available at [facility], Clinics and beyond.     If your goal is to exclusively breastfeed, avoid using any formula or artificial nipples (including bottles and pacifiers) while you are your baby are learning to breastfeed unless there is a medical reason.       Mixing breastfeeding and formula can interfere with how you begin building your milk supply.  It can impact how you and your baby  learn  to breastfeeding together and alter the natural growth of  good  bacteria in your baby s stomach.    Delay a pacifier or a bottle in the first few weeks until breastfeeding is well established. This is often around 3 weeks of age.    Ask your nurse to show you how to hand express.   Breast milk can be kept in the refrigerator or freezer for later use.    Hospital and Clinic  Resources:  -Schedule an appointment with a [facility] CNM who is also a Lactation Consultant by calling [phone]     -Schedule a clinic appointment with a [facility] CNM with dedicated clinic hours for breastfeeding assistance by calling [phone]. Breastfeeding clinic visits are at [facility] on Mondays, [facility] on Tuesdays and [facility] on Thursdays.     -Baby Café    Pregnant and interested in breastfeeding?  Need answers to breastfeeding questions?  Want  to help breastfeeding moms?  Already breastfeeding and want to meet other moms?    Join us at the Baby Café!    Baby Cafe is a free, drop-in service offering breast-feeding support for pregnant women, breast-feeding mothers and their families.  Come share tips and socialize with other mothers.  Babies and siblings are welcome (no childcare available).    Starting April 2018, Baby Café will be at 4 locations.  Please see below for the Baby Café closest to you!    [facility]  [address] [place] [address]  1st Wednesday: 10am-12pm    [facility]  [address] [place] [address]  3rd Wednesday 4-6pm    [facility]  [address] [place] [address]  4th Wednesday 10am-12:30pm    [facility]  [address] [place] [address]  4th Wednesdays: 4-6pm      Hmong, Estonian, and Guatemalan which is may be available at some sites.    For more information, please contact: [email or web address]. or [phone]    -Attend a baby weigh in at [facility].  Lactation consultants are available to answer questions  [facility]: Tuesdays 1:00 - 2:00  [facility]: Mondays 1:00 - 2:00  www.[facility]    -Attend one of the New Mama groups at [facility] in [facility].  [facility] also offers one-on-one in home and in office lactation consults.   www.[facility]    -Attend a Winnie Rosario meeting.  Multiple groups in several locations throughout the [facility]. The meetings are no-cost and always informative breastfeeding education session through [employer] La Leche League  Www.jacinto.org/  Medication use while breastfeeding: http://toxnet.nlm.nih.gov/newtoxnet/lactmed.htm     Online Resources:    [facility].org/baby sign up for free online weekly e-mail    [facility].org/maternity    Breastfeedingmadesimple.com    Llli.org (La Leche  Abraham)    Normalfed.com    Womenshealth.gov/breastfeeding    Workandpump.com    Breast-feeding Supplies & Pumps:  Talk to your insurance provider or WIC (Women, Infants and Children) to learn more about options available to you. Recent health insurance changes may include additional coverage for supplies and pumps.    Public Health:  Women, Infants and Children Nutrition program (WIC): provides breast-feeding support and education in addition to formal feeding moms. 969-JHU-0404 or http://www.health.[facility].us/divs/fh/wic    Family Health Home Visiting: [facility] Nurse home visits are available. Talk to your provider to see if you qualify. Most [facility] have a program available.    Additional Resources:  La Leche League is an international, nonprofit, nonsectarian organization offering information, education, and support to mothers who want to breast-feed their babies. Local groups offer phone help and monthly meetings. Visit Uniphore.Black & Veatch or Maven.Black & Veatch and us the  Find local support  drop down menu or click on the  Resources  tab.    [place] Breastfeeding Resources: 0-371-328-BABY (3382) toll free    National Breastfeeding Help Line trained breastfeeding peer counselors can help answer common breast-feeding questions by phone. Monday-Friday: English/Nepalese  1-175- 100-1997 toll free, 1-910.185.3782 (TTY)    [facility] Connection: 825-038-[facility] (2942)      Resources:    Childbirth and Parenting Education:   [facility]: http://Select Specialty HospitalLifefactory.ObjectFX/   ([phone] 076-HJTO  [place]: (education, yoga & wellness) www.Strikeface.ObjectFX  Enlightened Mama: www.enlightenedmama.com   Childbirth collective: (Parent topic nights)  www.childbirthcollective.org/  Hypnobabies:  www.hypnobabiestOneSun.com/  Hypnobirthing:  Http://hypnobirthing.com/  The Birth Hour: https://Bioabsorbable Therapeutics.ObjectFX/online-childbirth-class/    APPS and Podcasts:   Ovia  Glow Nurture  The Birth Hour (for birth stories)   Birthful   Expectful    The Longest Shortest Time  PregnancyPodcast Jany Calderon    Book Recommendations:   Rufina Yaya's Birthing From Within--first few chapters include a new-age tone, you may prefer to skip it and keep going, because there is good stuff later.  This book recommendation covers emotional preparation, but does cover coping with pain, and use of both pharmacological and nonpharmacological methods.    Dr. Gao' The Pregnancy Book and The Birth Book--the pregnancy book goes month-by month    Womanly Art of Breastfeeding by La Leche League International   Bestfeeding by Tyra Robert--great pictures    Mothering Your Nursing Toddler, by Stacey Gonzalez.   Addresses dealing with so many of the challenging behaviors of a nursing toddler.  How Weaning Happens, by La Leche League.  Discusses weaning at all ages, from medically necessary weaning of an infant, all the way up to age 5 (or older), with why/why not, and strategies.  Very empowering book both for deciding to wean and deciding not to.    American College of Nurse-Midwives (ACNM) http://www.midwife.org/; look at the informational handouts at http://www.midwife.org/Share-With-Women     www.mymidwife.org    Mother to Baby (Medication and Herbal guidance in pregnancy): http://www.mothertobaby.org  Toll-Free Hotline: 845.950.4234  LactMed (Medication use while breastfeeding): http://toxnet.nlm.nih.gov/newtoxnet/lactmed.htm    Women's Health.gov:  http://www.womenshealth.gov/a-z-topics/index.html    American pregnancy association - http://americanpregnancy.org    Centering Pregnancy (group prenatal care option): http://centeringhealthcare.org    Information about doulas:  Childbirth collective: http://www.childbirthcollective.org/  Doulas of [place] (ABDELRAHMAN):  www.abdelrahman.org  [place]  project: http://twincitiesdoulaproject.com/     Early Childhood and Family Education (ECFE):  ECFE offers parents hands-on learning experiences that will nourish a lifetime of  "teachable moments.  http://ecfe.info/ecfe-home/    March of Dimes www.Emirates Biodiesel.Frontier Silicon     FDA - Nutrition  www.mypyramid.gov  Under \"For Consumers,\" click on \"pregnant and breastfeeding women.\"      Centers for Disease Control and Prevention (CDC) - Vaccines : http://www.cdc.gov/vaccines/       When researching information on the web, question the validity of websites.  The Where's Up .gov, Tal Medical andInbiomotionorg tend to be more reliable information.  If there are a lot of advertisements, be cautious of the information provided. Stay away from blogs and chat rooms please!             Virtual Breastfeeding Support:    During this time of isolation, breastfeeding families need even more community!  Here are some area organizations offering virtual support groups for breastfeeding:      Latch Cafe Support Group, Tuesdays at 10:30 am   Run by KIRAN Cosme of [facility]   Go to [employer] [facility] [facility] Facebook page and click on \"events\" for link   https://www.SeedInvest.com/events/085765526452590/    [facility] Milk Hour, Thursdays at 2:30 pm    Run by KIRAN Gonzalez   Go to [facility] [facility] FB page and send message to get link   https://www.SeedInvest.com/healthfoundations/    Elyse Rosario [facility]/[place] holding virtual meetings the first Tuesday of each month, 8-9 pm, and the   Third Saturday, 10 - 11 am.  Go to La Leche League [facility] FB page; message to get link https://www.SeedInvest.Frontier Silicon/LLLofGoldenValyue/?hc_location=[facility]    [facility] offers a Lactation Lounge every Friday 12pm - 1pm, run by Elyse Mcfadden Leader   Sign up via link at Domo Safety/cbe-lactation   https://www.Domo Safety/cbe-lactation    [facility] is offering virtual support groups every Monday, 10:30 am - 12 pm, run by nurse " IBCLC   Https://www.OncoStem Diagnostics.com/events/[email or web address]/    Prenatal Breastfeeding Classes:        PWA is offering virtual breastfeeding and  care classes:  https://www.Spinifex Pharmaceuticals/education-workshops    BirthEd childbirth and breastfeeding education offering virtual prenatal breastfeeding classes  https://www.Brandkidsmn.AdChoice/workshops

## 2021-06-14 NOTE — PATIENT INSTRUCTIONS - HE
[facility] Nurse Midwives [facility]  Contact information:  Appointment line and to get a hold of CNM in clinic Monday-Friday 8 am - 5 pm:  [phone].  There are some clinics with early start times (1st appointment 7:40 am) and others with evening hours (last appointment 6:20 pm).  Most are typically open from 8 am to 5 pm.    CNM on call answering service: [phone].  Specify your hospital of choice and leave a brief message for CNM;  will then page CNM who is on call at your specified hospital and you should receive a call back with 15 minutes.  Be sure that your ringer is audible and that you can accept blocked calls so that we can get back in touch with you! This number should be reserved for urgent needs if during the day, before 8 am, after 5 pm, weekends, holidays.    Contact the on-call CNM with warning signs, such as:    vaginal bleeding     Vaginal discharge and itching or pain and burning during urination    Leg/calf pain or swelling on one side    severe abdominal pain    nausea and vomiting more than 4-5 times a day, or if you are unable to keep anything down    fever more than 100.4 degrees F.        You are invited to  Meet the [facility] Nurse Midwives [facility]    Virtual:   [email or web address]://[email or web address]/classes-and-events/meet-the-midwives-from-[facility]    A way to tour the hospital Labor and Delivery unit and meet the midwives in our group was postponed at the start of hospital restrictions following COVID-19. We will resume these when able.    Please call [phone] for ongoing updates.          Touring the Maternity Care Center  At this time we are offering a virtual tour of the Maternity Care Centers at both [facility] and [facility]:   [facility]: [email or web address]://[email or web address]-[email or web address]-Mzgudh/Maternity-Care-Center  [facility]:  "  [email or web address]://[email or web address]/overarching-care/the-birthplace/tours  https://www.PokitDok.org/Locations/[facility]/Maternity-Care-Center/#virtual_tour  When in person tours become available, registrations is required. To schedule a tour at either [facility] or [facility], please do so online using the following links:  [facility] - [email or web address]://[email or web address].Elevance Renewable Sciences.OpenText/registerlist.[email or web address]?s=[email or web address]&[email or web address]&[email or web address]&p=[email or web address]&[email or web address]&ps=1&[email or web address]&it=1&npf=254  [facility] - [email or web address]&[email or web address]&[email or web address]&[email or web address]&[email or web address]&[email or web address]&group=38&it=1&jmu=089      Pre-registration for Hospital Stay:  Sometime betweeen 30-37 weeks, it is recommended that you \"pre-register\" for your upcoming hospital stay on our website:    [email or web address]    Breastfeeding and Birth Control  How do I decide what birth control method is best for me while I am breastfeeding?  Choosing a method of birth control is very personal. First, answer the following questions:      Do you want to have more children?    How much spacing between births do you want for your children?    Do you smoke or have you had any health problems, such as liver disease or a blood clot?  Talk about the answers to each of these questions with your health care provider to help you choose the best method for you.    Can I use breastfeeding as my birth control?  Using breastfeeding as your birth control (the lactational amenorrhea method) can be a good way to keep from getting pregnant in the first months after the baby is born. Each time your baby nurses, your body releases a hormone called prolactin, which stops your body from making the hormones that cause you to ovulate (release an egg). If you are not ovulating, you cannot get pregnant.    The lactational amenorrhea method works only if:    you have not started your period yet.    you are breastfeeding only and not giving your baby any other food or drink.    " you are breastfeeding at least every 4 hours during the day and every 6 hours at night.    your baby is less than 6 months old.  When any 1 of these 4 things is not happening, you no longer have good protection from getting pregnant, and you should use another form of birth control.    What birth control methods are safe for me to use while I breastfeed?    Methods without hormones  Methods without hormones do not affect you, your baby, or your breastfeeding.  Methods without hormones that are the most effective    The copper intrauterine contraceptive device (IUD) (ParaGard) is a small, T-shaped device that is in- serted into your uterus (womb) through the vagina and cervix. The copper IUD lasts for 10 years.    Sterilization (getting your tubes tied or your partner having a vasectomy) is very effective, but it is per- manent. You should choose sterilization only if you do not want to have more children.  A method without hormones that is effective    The lactational amenorrhea method described above is effective for the first 6 months.  Methods without hormones that are less effective    Natural family planning is monitoring your body for signs of ovulation and not having sex when you think you are ovulating. This method is reliable only if you are having regular periods every month.    Barrier methods (condoms, diaphragms, sponges, and spermicides) are used at the time you have sex. These methods are effective only if you use them correctly every time.    Methods with hormones  Birth control methods that use hormones can be used while you are breastfeeding. They may have a small effect on lowering the amount of milk you make. All hormones will get into your breast milk in very small amounts, but there is no known harm to your baby from this small amount of hormone in breast milk.only methodsmethods use only 1 hormone, called progestin. You can start them right after your baby is born or wait 4 to 6 weeks to make  sure your milk supply is good.     Progestin-only pills ( minipills ): If you like to take pills every day, you can use the minipill. In order forpill to work well, you have to take 1 at the same time each day. When you stop breastfeeding, you should start pills that have both estrogen and progestin because they are better at keeping you from get- ting pregnant.     Progestin IUD (Mirena): The progestin IUD is shaped and inserted into the uterus like the copper IUD. It works for up to 5 years. Both IUDs are usually inserted 4 to 6 weeks after the baby is born.    Progestin implant (Nexplanon): The progestin implant is a small matchstick-sized flexible isaiah. It is placed into the fatty tissue in the back of your arm. It works for up to 3 years.    Progestin shot (Depo-Provera): The progestin shot is given every 3 months.    estrogen and progestin methods  These methods use 2 hormones, called estrogen and progestin.     These methods increase your risk of a blood clot, which is already higher than normal after you have a baby. You should not use them until your baby is at least 6 weeks old. The combined methods are not recommended as the first choice for women who are breastfeeding. If a combined method is the one that you feel will be best for you to prevent getting pregnant, these methods are okay to use while breastfeeding.     Combined birth control pills: You take a pill each day.    Vaginal ring (NuvaRing): The ring is worn in the vagina for 3 weeks then left out for 1 week before youin a new ring.    Patch (Ortho Evra): The patch is placed on your skin and changed every week for 3 weeks then left off forweek before putting a new patch on a different area of your skin.    Pediatric Care Providers at [facility] Nurse [facility]:    Choosing the right provider is one of the most important decisions you ll make about your health care. We can help you find the right one. Remember, you re looking for a  provider you can trust and work with to improve your health and well-being, so take time to think about what you need. Depending on how complicated your health care needs are, you may need to see more than one type of provider.    Primary Care Providers: You ll see a primary care provider first for most health issues. They ll work with you to get your recommended screenings, help you manage chronic conditions, and refer you to other types of providers if you need them. Your primary care provider may be called a family physician or doctor, internist, general practitioner, nurse practitioner, or physician s assistant. Your child or teenager s provider may be called a pediatrician.  Specialists: You ll see a specialist for certain services or to treat specific conditions. Specialists include cardiologists, oncologists, psychologists, allergists, podiatrists, and orthopedists. You may need a referral from your primary care provider before you go to a specialist in order for your health plan to pay for your visit.\pardHere are some tips for finding a provider where you live:  If you already have a provider you like and want to keep working with, call their office and ask if they accept your coverage.  Call your insurance company or state Medicaid and CHIP program. Look at their website or check your member handbook to find providers in your network who take your health coverage.  Ask your friends or family if they have providers they like and use these tools to compare health care providers in your area.    Family Medicine at  [facility]:     https://[email or web address]/[email or web address]/family-medicine    Many of our families enjoy all seeing the same doctor, who comes to know the whole family very well. We base our practice on the knowledge of the patient in the context of family and community.  WHY CHOOSE A FAMILY MEDICINE PHYSICIAN?  Ability of the whole family to see the same doctor  Focus on  the whole person, including physical and emotional health  A personal relationship with their doctor that is nurtured over time  Respect for individual and family beliefs and values  No need to change primary care providers when a certain age is reached  Coordination of care when other health care services are needed    Pediatrics at  [facility]:   [email or web address]://[email or web address]/[email or web address]/[email or web address]-care    Through a teaching affiliation with the [facility], [facility] staff keeps current on new developments in the field of pediatrics.     Everything we do centers around caring for children. We place special emphasis on wellness and prevention.pediatric care team includes a team of pediatricians and certified nurse practitioners who provide care to pediatric and adolescent patients ages 0 to 18, and some up to the age of 26. We offer preventive health maintenance for healthy children as well the diagnosis and treatment of common and chronic illnesses and injuries. In addition, we also offer several pediatric specialists who focus on adolescent health issues and developmental and behavioral issues.    Circumcision    Educational video:     [email or web address]    What is circumcision?  At birth, baby boys have loose skin that covers the head of the penis. This skin is called the foreskin. When all or part of the foreskin of the penis is cut off, this is called circumcision.  Why is circumcision done?  Circumcision is done for many reasons including Moravian, cultural, looks, and health. Some Moravian groups circumcise all boys as a jair-based practice. Many people in the [place] choose to circumcise their baby boys because they believe it is culturally normal. It is not a common practice in [place], [place], or [place].  Some parents choose circumcision so that their son will have a penis that looks like his  father s if the father was also circumcised. Other people choose circumcision because they believe it is  or will protect the boy or man from infection or cancer later in life.  Does circumcision protect against infection or cancer?  Circumcision does seem to protect against some types of infection or cancer. Cancer of the penis is one type of cancer that circumcision may prevent. However, cancer of the penis is very rare. One hundred thousand circumcisions would need to be done to prevent one case of cancer of the penis. Circumcision may also decrease the chance of some sexually transmitted infections, such as HIV and human papilloma virus (HPV). See the next page for more information on the risks and benefits of circumcision.  What happens during a circumcision?  Babies born in the hospital are usually circumcised before they go home. Health care providers also perform circumcisions in their offices and clinics within a few weeks after birth.  Church circumcisions are most often done at home or in a Buddhism.  Before the circumcision is performed, some providers give an injection (shot) of a small amount of anesthetic (numbing medicine) at the base of the penis to block the pain or put an anesthetic cream on the penis to numb the area that will be cut.  There are 2 different ways to do a circumcision. In one type, a clamp placed around the head of the penis cuts off the blood supply to the foreskin, and the foreskin above the clamp is cut off. The clamp is left on the penis until the area heals and it falls off a few days later. In another type of circumcision, the foreskin is cut off with scissors or a scalpel.  After the circumcision, petroleum jelly and sometimes gauze may be put over the area of the penis where the skin was removed. This protects the end of the penis while it heals.  Can I keep my son s penis  if it is circumcised?  Regular washing with soap and water will keep any penis  clean. Circumcision does not make the penis . Uncircumcised boys do need to be taught to clean beneath their foreskin, just like they need to be taught to wash their hands or brush their teeth.  How do I decide if I should have my son circumcised?  The American Academy of Pediatrics (AAP) says that  circumcision may have health benefits. They do not recommend circumcision for all boys as a routine procedure. The AAP recommends that you talk to your health care provider to decide if circumcision is the right choice for your family. You may also wish to discuss the question with your family or .  What are the risks and benefits of circumcision?  We do not have a lot of good scientific information about the health risks or benefits of circumcision.  Possible Risks:  Very few baby boys (less than 1 in 100) will have a problem after circumcision, such as bleeding or mild infection of the penis. These problems are usually not serious and are easy to treat.  Less common problems are:    Removal of too much or too little foreskin  Some rare problems are:    Narrowing of the opening of the penis, which can cause problems with urination    Removal of part of the penis or death of some of the other skin on the penis   Infection that spreads to other parts of the body  People used to think babies did not really feel pain. Now we know that they do. Many baby boys appear to feel a lot of pain during circumcision if anesthesia is not used.  We do not know if circumcision affects sexual function or sensation.  Possible Benefits:    Less risk for some kinds of cancers, like cancer of the penis    Fewer urinary tract (bladder or kidney) infections for babies    Less risk for some sexually transmitted infections, such as HIV, herpes, and HPV     May protect female sexual partners from some sexually transmitted infections    For More Information  American Academy of Family Physicians:  Circumcision  http://familydoctor.org/familydoctor/en/pregnancy-newborns/caring-for-newborns/infant- care/circumcision.html  MedlinePlus: Circumcision (includes a slide show on the procedure)  www.nlm.nih.gov/medlineplus/circumcision.html  American Academy of Pediatrics: Policy statement on circumcision  Http://pediatrics.aappublications.org/content/130/3/e756.abstract      Childbirth and Parenting Education:   [employer] [facility]: http://Nexsan/   (770) 330-LYBE  [place]: (education, yoga & wellness) www.RentMama  Enlightened Mama: www.Paradigm Spine   Childbirth collective: (Parent topic nights)  www.childbirthcollective.org/  Hypnobabies:  www.hypnW-locatebiestKartRocket.Think1stBoxing.com/  Hypnobirthing:  Http://Immure Records.Think1stBoxing.com/  The Birth Hour: https://Crossbow Technologies/online-childbirth-class/    APPS and Podcasts:   Ovia  Glow Nurture  The Birth Hour (for birth stories)   Birthful   Expectful   The Longest Shortest Time  PregnancyPodcast Jany Calderon    Book Recommendations:   Rufina Yaay's Birthing From Within--first few chapters include a new-age tone, you may prefer to skip it and keep going, because there is good stuff later.  This book recommendation covers emotional preparation, but does cover coping with pain, and use of both pharmacological and nonpharmacological methods.    Dr. Gao' The Pregnancy Book and The Birth Book--the pregnancy book goes month-by month    Womanly Art of Breastfeeding by La Leche League International   Bestfeeding by Tyra Robert--great pictures    Mothering Your Nursing Toddler, by Stacey Gonzalez.   Addresses dealing with so many of the challenging behaviors of a nursing toddler.  How Weaning Happens, by La Leche League.  Discusses weaning at all ages, from medically necessary weaning of an infant, all the way up to age 5 (or older), with why/why not, and strategies.  Very empowering book both for deciding to wean and deciding not to.    American College of  "Nurse-Midwives (ACNM) http://www.midwife.org/; look at the informational handouts at http://www.midwife.org/Share-With-Women     www.mymidwife.org    Mother to Baby (Medication and Herbal guidance in pregnancy): http://www.mothertobaby.org  Toll-Free Hotline: 720.475.7111  LactMed (Medication use while breastfeeding): http://toxnet.nlm.nih.gov/newtoxnet/lactmed.htm    Women's Health.gov:  http://www.womenshealth.gov/a-z-topics/index.html    American pregnancy association - http://americanpregnancy.org    Centering Pregnancy (group prenatal care option): http://centeringhealthcare.org    Information about doulas:  Childbirth collective: http://www.childbirthcollective.org/  Doulas of [place] (ABDELRAHMAN):  www.abdelrahman.org  [facility]  project: http://WePowcitiesdoulaproject.com/     Early Childhood and Family Education (ECFE):  ECFE offers parents hands-on learning experiences that will nourish a lifetime of teachable moments.  http://ecfe.info/ecfe-home/    March of Dimes www.Blue Marble Materials.CloudCheckr     FDA - Nutrition  www.mypyramid.gov  Under \"For Consumers,\" click on \"pregnant and breastfeeding women.\"      Centers for Disease Control and Prevention (CDC) - Vaccines : http://www.cdc.gov/vaccines/       When researching information on the web, question the validity of websites.  The domains .gov, .edu and.org tend to be more reliable information.  If there are a lot of advertisements, be cautious of the information provided. Stay away from blogs and chat rooms please!             Virtual Breastfeeding Support:    During this time of isolation, breastfeeding families need even more community!  Here are some area organizations offering virtual support groups for breastfeeding:      [employer] Support Group, Tuesdays at 10:30 am   Run by KIRAN Cosme of [employer] [facility] [facility]   Go to [employer] Facebook page and click on \"events\" for " link   https://www.AirPOS.com/events/[email or web address]/    [facility] Milk Hour,  at 2:30 pm    Run by KIRAN Gonzalez   Go to [facility] FB page and send message to get link   https://www.AirPOS.com/healthfoundations/    [facility] holding virtual meetings the first Tuesday of each month, 8-9 pm, and the   Third Saturday, 10 - 11 am.  Go to [facility] [place] FB page; message to get link [email or web address]/?hc_location=[facility]    Lin offers a Lactation Lounge every Friday 12pm - 1pm, run by Elyse Mcfadden Leader   Sign up via link at Tenant Magic/cbe-lactation   https://www.Tenant Magic/cbe-lactation    [facility] is offering virtual support groups every Monday, 10:30 am - 12 pm, run by nurse IBCLC   Https://[email or web address]/    Prenatal Breastfeeding Classes:        [facility] is offering virtual breastfeeding and  care classes:  https://www.Tenant Magic/education-workshops    BirthEd childbirth and breastfeeding education offering virtual prenatal breastfeeding classes  https://www.Starburst Coin Machinesedmn.com/workshops

## 2021-06-14 NOTE — TELEPHONE ENCOUNTER
Return call to patient.  Advised of CNM documentation below.  Patient verbalizes understanding and states that she will ask CNM for EFW in clinic tomorrow.  She offers no further questions or concerns at this time.

## 2021-06-15 NOTE — TELEPHONE ENCOUNTER
Pt is 32 weeks and had a sam small amount of blood last night when she wiped after using the bathroom. She ahs checked since and there has been none. She would just like to hear from a CNM to discuss this.

## 2021-06-15 NOTE — PROGRESS NOTES
Vero is here with Catina for a routine prenatal visit at 36w6d.  She has no concerns and is feeling well.  Fetal movement is normal. Reports sleeping and feeling well. Patient is experiencing no s/s PTL, Pre-E, vaginal leaking of fluid or vaginal bleeding. Happy to hear she doesn't have GBS.       GBS Negative    Hgb 11.6    Discussed potential waterbirth again and given consent to review.     Discussed perineal massage. Handout given.   .  Fetal maturity and expectations for fetal movement in the third trimester, how and when to do fetal kick counts and when to call the CNM and contact information reviewed.  Also discussed registration for hospital, what to bring, active management of 3rd stage labor, PP immunizations, length of stay.   Anticipatory guidance, signs of symptoms of labor or SROM, third trimester warning signs, when to call and CNM contact information reviewed.   RTC in one week.     DOMENIC Quevedo,CRISTOBAL  3/15/2021 12:14 PM

## 2021-06-15 NOTE — TELEPHONE ENCOUNTER
"TC: Called Vero 21 yo  at 32/1 weeks who reports she noticed a scant amount of vaginal bleeding at 5am this morning when she went to the bathroom. It was so little she \"had to push the toilet paper inside\" to be able to see it. She has not noticed any further bleeding. She reports good fetal movement. Denies LOF, recent bowel movement, IC or vaginal exam.     Encouraged Vero to continue to watch for further bleeding and reviewed s/sx of PTL.     Dolores Beth, DOMENIC,CNM    "

## 2021-06-15 NOTE — PROGRESS NOTES
Vero is here with Catina.  for a routine prenatal visit at 36w1d.  She has no concerns and is feeling well.  Missed last visit due to work. Was unaware she could do video or telephone visit. Fetal movement is normal. Overall weight gain is 23 pounds. Denies s/s PTL, preeclampsia. Plans to work one more week. Has four weeks off after delivery. Planning birth at  due to feeling mistreated/having a bad experience when she had SAB at [facility]. She did not expand. She is thinking she might want waterbirth but does think she will likely need an epidural. Catina is her support person. She may interested in the volunteer  program at  but is not interested in looking for  otherwise. They have many baby supplies. Lots of support from his mom. His brother has a 7 month old.   Vero would like to self collect GBS. She does not wish to go on BC after delivery thought we did discuss non-hormonal options. She states her mood is good and has an EPDS of 1 today with 0 for #10. She has had therapy in the past but not medications.    Fetal maturity and expectations for fetal movement in the third trimester, how and when to do fetal kick counts and when to call the CNM and contact information reviewed.    Reviewed GBS testing indications and possible need for abx in labor.     Discussed that we recommend 6 wks off after delivery but do understand that life sometimes makes that impossible. We are here to support her as we can.     Reviewed warning signs for PTL, ROM, Preeclampsia.     Fetal position verified by bedside ultrasound.   Also discussed early labor interventions, epidural..  Encouraged to schedule weekly visits to/through her EDB.       DOMENIC Quevedo,CRISTOBAL  3/10/2021 4:07 PM

## 2021-06-15 NOTE — PATIENT INSTRUCTIONS - HE
"[facility] Nurse Midwives - Contact information:  Appointment line and to get a hold of CNM in clinic Monday-Friday 8 am - 5 pm:  [phone].  There are some clinics with early start times (1st appointment 7:40 am) and others with evening hours (last appointment 6:20 pm).  Most are typically open from 8 am to 5 pm.    CNM on call answering service: [phone].  Specify your hospital of choice and leave a brief message for CNM;  will then page CNM who is on call at your specified hospital and you should receive a call back with 15 minutes.  Be sure that your ringer is audible and that you can accept blocked calls so that we can get back in touch with you! This number should be reserved for urgent needs if during the day, before 8 am, after 5 pm, weekends, holidays.    Contact the on-call CNM with warning signs, such as:    vaginal bleeding     Vaginal discharge and itching or pain and burning during urination    Leg/calf pain or swelling on one side    severe abdominal pain    nausea and vomiting more than 4-5 times a day, or if you are unable to keep anything down    fever more than 100.4 degrees F.       Meet the Midwives from [facility]  You are invited to an informational meet and greet with [facility] Certified Nurse-Midwives. Our free \"Meet the Midwives\" event is a great opportunity to learn about our midwives' philosophy and experience, the hospitals where we can assist with your birth, and answer questions you may have. Partners, friends, and family are welcome to attend. Currently, this is a virtual event.  Date  First Tuesday of every month at 7 pm.    Link to next (live) meeting  https://www.[email or web address]/classes-and-events/meet-the-midwives-from-[facility]-clinics  To Join by Telephone (audio only) Call:   [phone] Phone Conference ID: [id number]          Touring the Maternity Care Center  At this time we are offering a virtual tour of the " Maternity Care Centers at both [facility] and [facility]:   [facility]: https://www.[place].org/Locations/[facility]/Maternity-Care-Center  [facility]:   https://[facility].org/[facility]-care/the-birthplace/tours  https://www.[facility].org/Locations/[facility]/Maternity-Care-Center/#virtual_tour  When in person tours become available, registrations is required. To schedule a tour at either [facility] or [facility], please do so online using the following links:  [facility] - [email or web address]://www.[employer]/registerlist.asp?s=6&m=303&vs=5&p=2&ddkmn=760&ps=1&group=37&it=1&vmz=992  [facility] - https://www.[employer]/registerlist.asp?s=6&m=303&vs=5&p=2&pxdnh=414&ps=1&group=38&it=1&xqq=655    Childbirth and Parenting Education:   [facility] center: http://ExelonixLong Island College HospitalMobibao Technology/   (624) 109-BABY  [place]: (education, yoga & wellness) www.Inveshare  Enlightened Mama: www.ProfigenedIsis Biopolymer.Qualnetics   Childbirth collective: (Parent topic nights)  www.childbirthcollective.org/  Hypnobabies:  www.hypnobabiestFileforcecities.com/  Hypnobirthing:  Http://hypnobirthing.com/  The Birth Hour: https://thebirthhour.Qualnetics/online-childbirth-class/    APPS and Podcasts:   Ovia  Glow Nurture  The Birth Hour (for birth stories)   Birthful   Expectful   The Longest Shortest Time  PregnancyPodcast Jany Calderon    Breastfeeding Information:  An excellent 15-minute video on preparing for a good breastfeeding experience, including how to ensure you have a good milk supply and a comfortable latch, can be found here:  https://Jike Xueyuan.Qualnetics/      Book Recommendations:   Rufina Yaya's Birthing From Within--first few chapters include a new-age tone, you may prefer to skip it and keep going, because there is good stuff later.  This book recommendation covers emotional preparation, but does cover coping with pain, and use of both pharmacological and nonpharmacological methods.    Dr. Gao' The  "Pregnancy Book and The Birth Book--the pregnancy book goes month-by month    Womanly Art of Breastfeeding by La Leche League International   Breastfeeding by Tyra Robert--great pictures    Mothering Your Nursing Toddler, by Stacey Gonzalez.   Addresses dealing with so many of the challenging behaviors of a nursing toddler.  How Weaning Happens, by La Leche League.  Discusses weaning at all ages, from medically necessary weaning of an infant, all the way up to age 5 (or older), with why/why not, and strategies.  Very empowering book both for deciding to wean and deciding not to.    American College of Nurse-Midwives (ACNM) http://www.midwife.org/; look at the informational handouts at http://www.midwife.org/Share-With-Women     www.mymidwife.org    Mother to Baby (Medication and Herbal guidance in pregnancy): http://www.mothertobaby.org  Toll-Free Hotline: 861.471.5943  LactMed (Medication use while breastfeeding): http://toxnet.nlm.nih.gov/newtoxnet/lactmed.htm    Women's Health.gov:  http://www.womenshealth.gov/a-z-topics/index.html    American pregnancy association - http://americanpregnancy.org    Centering Pregnancy (group prenatal care option): http://centeringhealthcare.org    Information about doulas:  Childbirth collective: http://www.childbirthcollective.org/  Doulas of [place] (ABDELRAHMAN):  www.abdelrahman.org  [facility]  project: http://twincitiesdoulaproject.com/     Early Childhood and Family Education (ECFE):  ECFE offers parents hands-on learning experiences that will nourish a lifetime of teachable moments.  http://ecfe.info/ecfe-home/    March of Dimes www.RealSpeaker Inc.Great Dream     FDA - Nutrition  www.mypyramid.gov  Under \"For Consumers,\" click on \"pregnant and breastfeeding women.\"      Centers for Disease Control and Prevention (CDC) - Vaccines : http://www.cdc.gov/vaccines/       When researching information on the web, question the validity of websites.  The domains .gov, .edu and.org tend to " be more reliable information.  If there are a lot of advertisements, be cautious of the information provided. Stay away from blogs and chat rooms please!      Screening Program  Http://www.health.[facility].mn./newbornscreening/  [place] newborns are tested soon after birth for more than 50 hidden, rare disorders, including hearing loss and critical congenital heart disease (CCHD). This site provides resources and information for families and providers.    HEALTHY PREGNANCY CARE: 37 to 41 WEEKS PREGNANT    Talk with your midwife or physician about when to call with signs of labor    Regular uterine contractions that are getting closer together and/or stronger    If you think your water has broken or is leaking    Bleeding from the vagina like a period (bloody vaginal discharge is normal)    If you are not feeling your baby move    Make plans for transportation and  as needed for when you are going to the hospital.    Your midwife or physician may offer to check your cervix for changes.     Ask your health care provider about vaccinations you may need following delivery. By now, you should have received a Tdap immunization to protect against pertussis or whooping cough. Fathers and family members who will be in close contact with the baby should also receive a Tdap shot at least two weeks before the expected birth of the baby if they have not had a Td (tetanus) shot for at least two years.    If you are past your due date, discuss the next steps leading to delivery with your midwife or physician. If you don't start labor on your own by 41 or 42 weeks, your midwife or physician may recommend giving you medicines to ripen your cervix and start labor.  Induction of labor: http://onlinelibrary.garber.com/store/10.1016/j.jmwh.2008.04.018/asset/j.jmwh.2008.04.018.pdf?v=1&t=yhna5fmi&l=[email or web address]h2qh755    Preparing for your baby: Tell your midwife or physician how you plan to feed your  baby (breast or bottle), who you have chosen to do pediatric care for your baby, and if you have a boy, whether you have chosen to have him circumcised. You will need a car seat correctly installed in your vehicle to bring your baby home. As you start to set up the nursery at home for your baby, make sure the crib is safe. The mattress needs to fit snugly against the edges of the crib. If you can fit a soda can between the bars, they are too far apart and can allow the baby's head to caught between them.    Learn about infant care and feeding, including information about infant CPR. We recommend that you put your baby to sleep on his or her back to reduce the chance of Sudden Infant Death Syndrome (SIDS). To maintain a healthy environment in which your child can grow, it's best to keep your home smoke-free. By preparing ahead, your transition into parenthood will go smoothly for you and your baby.    Your midwife or physician will want to see you for a checkup 2 to 6 weeks after delivery.    If you have questions about any symptoms you are experiencing or any other concerns, call your provider or their clinic staff at [facility] [place] at Phone: [phone]. If it's after clinic hours, physician patients should call the Care Connection at [phone] ([phone]); midwife patients should call their answering service at [phone].    How can you care for yourself at home?   You can refer to the Starting Out Right book or find it online at http://www.[facility]altheast.org/images/stories/maternity/[facility]-Starting-Out-Right.pdf or http://www.healtheast.org/images/stories/flipbooks/[facility]-starting-out-right/[facility]-starting-out-right.html#p=8     You can sign up for a weekly parenting e-mail that gives support, tips and advice from health care professionals that starts with pregnancy and continues through the toddler years. To register, go to www.healtheast.org/baby at any time during your  pregnancy.        Making Plans for Feeding My Baby    By this point, you probably have read a lot about feeding your baby.  Breastfeed or formula? Each mother s decision is her own and [facility] respects you and your choices. We ve gathered information on both breastfeeding and formula feeding to help with your decision. Talking with your physician or nurse-midwife can also help in your decision.  However you plan to feed your baby, [facility] Maternity Care Centers encourage rooming in with your baby, skin-to-skin contact and feeding your baby based on his or her cues.    Skin-to-skin contact  Being close to mom helps your baby adjust to life outside of the womb.  It helps your baby regulate their body temperature, heart rate, and breathing.  Your baby will usually be placed skin-to-skin immediately following birth or as soon as possible, if medical intervention is needed.    Rooming-In  Having your baby stay with you in your room is called  rooming-in .  Keeping your baby in your room helps you to learn how to care for your baby by getting to know your baby s cues, body rhythms and sleep cycle.       Cue-based feeding  Cues (signals) are baby s way of telling you what he or she wants.  When you learn your infant s cues, you know how to care for and feed your baby.   Feeding cues are the licking and smacking of lips, bringing their fist to their mouth, and a reflex called  rooting - where baby turns and opens his or her mouth, searching for the breast or bottle.  Crying is a late feeding cue.  Babies can feed frequently, often at least 8 times in 24 hours.    Breastfeeding facts  Breast milk is the best source of nutrition for your baby and is available at birth. In the first couple of days, your milk volume is already starting to increase, though it may not be noticeable. Breastfeed frequently to increase your milk supply. Within three to five days, you will begin to notice larger milk volumes. An increase in  breast size, heaviness and firmness are often described as the milk  coming in.  Frequent breastfeeding can help breasts from getting overly firm and painful. You will know the baby is getting enough milk if your baby is having wet and dirty diapers and gaining weight.     If your goal is to exclusively breastfeed, it is important to not use any formula or artificial nipples (including bottles and pacifiers) while your baby is learning to breastfeed.  While it may seem like an  easy  option to give your baby a bottle, formula should only be given if there is a medical reason for your baby to have it.      Positioning and attachment   Get comfortable.  Use pillows as needed to support your arms and baby.  Hold baby close at the level of your breast, facing you in a tummy to tummy position.  Skin to skin helps with this.  Position the baby with his or her nose by the nipple.  There should be a straight line from baby s ear to shoulder to hips.  Tickle your baby s lips or wait for baby to open mouth wide, bring baby to breast by leading with the chin.  Aim the nipple at the roof of baby s mouth.  A rapid sucking pattern is followed by longer, drawing pattern with occasional swallows heard.  When baby is correctly latched, your nipple and much of the areola are pulled well into baby s mouth.      Returning to work or school  Focus on a good start to breastfeeding.  Many women continue to provide breastmilk for their baby when they return to work or school.  Making plans about where to pump and store milk can make the transition go well.  Talk with other mothers who have also returned to work or school for tips and support.  Your employer s Human Resource department may be a resource as well.     Returning to work or school: (continued)     babies can mean fewer  sick  days for you.    A quality breast pump will also save time and add comfort.  Check with your insurance prior to giving birth for breast pump  coverage.  Many insurance companies include a pump within your benefits.    Wait until your baby is at least three weeks old to introduce a bottle for the first time.  Have someone besides you give the bottle.    Breastfeed when you are with your baby. Reserve your bottles of breast milk for when you are away.     Your breasts will need to be  emptied  either by your baby or a pump.  Plan to pump at least twice in an eight hour day.    If you cannot pump at work, continue breastfeeding at home. Any amount of breast milk is worth giving to your baby.    Formula feeding facts  If you are planning to use formula to feed your baby, you will want to make some preparations ahead of time. Talk to your doctor or nurse-midwife about what type of formula to use. Some are iron-fortified, meaning they have extra iron in them. You will want to purchase formula and bottles before your baby is born to be sure you are ready after you return from the hospital. The [facility] do not provide formula samples to take home.    Be sure to follow formula mixing directions closely. Regular milk in the dairy case at the grocery store should not be given to babies under 1 year old. Baby formula is sold in several forms including:    Ready-to-use. This is the most expensive, but no mixing is necessary.    Concentrated liquid. This is less expensive than ready-to-use and you mix with water.    Powder. This is the least expensive. You mix one level scoop of powdered formula with two ounces of water and stir well.    Most babies need 2.5 ounces of formula per pound of body weight each day. This means an 8-pound baby may drink about 20 ounces of formula a day; however, this is just an estimate. The most important thing is to pay attention to your baby s cues.  If your baby is always fussy, needs more iron or has certain food allergies, your physician may suggest you change your baby s formula to a different kind.     How do I warm my  baby s bottles?  You may feed your baby a bottle without warming it first. It is OK for the breast milk or formula to be cool or room temperature. If your baby seems to prefer it warmed, you can put the filled bottle in a container of warm water and let it stand for a few minutes. Check the temperature of the liquid on your skin before feeding it to your baby; to be sure it isn t too hot. Do not heat bottles in the microwave. Microwaves heat food and liquids unevenly, and this can cause hot spots that can burn your baby.    How do I clean and sterilize bottles?  Sterilize bottles and nipples before you use them for the first time. You can do this by putting them in boiling water for 5 minutes. After that first time, you can wash them in hot and soapy water. Rinse them carefully to be sure there is no soap left on them. You can also wash them in the .    Care Connection [phone] ([phone])    Baby Café    Pregnant and interested in breastfeeding?  Need answers to breastfeeding questions?  Want to help breastfeeding moms?  Already breastfeeding and want to meet other moms?    Join us at the Baby Café!    Baby Cafe is a free, drop-in service offering breast-feeding support for pregnant women, breast-feeding mothers and their families.  Come share tips and socialize with other mothers.  Babies and siblings are welcome (no childcare available).    Starting April 2018, Baby Café will be at 4 locations.  Please see below for the Baby Café closest to you!      [facility]  [address] [place] [address]  1st Wednesday: 10am-12pm    [facility]  [address] [place] [address]  3rd Wednesday 4-6pm    [facility]  [address] [place] [address]  4th Wednesday 10am-12:30pm    [employer]  [address] [place] [address]  4th Wednesdays: 4-6pm      Hmong, Central African, and Sudanese which is may be available at some sites.    For more information,  "please contact: [email or web address]. or [phone]      Virtual Breastfeeding Support:    During this time of isolation, breastfeeding families need even more community!  Here are some area organizations offering virtual support groups for breastfeeding:      Lat Cafe Support Group,  at 10:30 am   Run by KIRAN Cosme of [employer]   Go to [employer] Facebook page and click on \"events\" for link   [email or web address]    [facility] Milk Hour,  at 2:30 pm    Run by KIRAN Gonzalez   Go to [facility] FB page and send message to get link   https://www.Kidblog.[place]/InQ Biosciencesfoundations/    [facility] holding virtual meetings the first Tuesday of each month, 8-9 pm, and the   Third Saturday, 10 - 11 am.  Go to [facility] [place] FB page; message to get link [email or web address]://www.Kidblog.[place]/LLLofGoldenValley/?hc_location=[facility] offers a Lactation Lounge every Friday 12pm - 1pm, run by [facility] Leader   Sign up via link at VouchedFor/cbe-lactation   [email or web address]://www.VouchedFor/[email or web address]-lactation    [facility] is offering virtual support groups every Monday, 10:30 am - 12 pm, run by nurse IBCLC   Https://www.[email or web address]/    Prenatal Breastfeeding Classes:        [employer] is offering virtual breastfeeding and  care classes:  https://www.VouchedFor/education-workshops    BirthEd childbirth and breastfeeding education offering virtual prenatal breastfeeding classes  https://www.birthedmn.com/workshops      "

## 2021-06-16 PROBLEM — Z30.09 ENCOUNTER FOR GENERAL COUNSELING AND ADVICE ON CONTRACEPTIVE MANAGEMENT: Status: ACTIVE | Noted: 2021-06-07

## 2021-06-16 NOTE — PROGRESS NOTES
Vero is here with Catina for a routine prenatal visit at 38w0d.  She has c/o having some cramping off and on the last two day.. Did not last long and was felt in the lower abdomen, along the rectum some and maybe low cervix area. She didn't time them as she is unsure how to do that.  She did call the online CNM and wished to wait to be evaluated until her visit.   Fetal movement is normal. Overall weight gain is 28 pounds.  Patient is not experiencing cramping now. Denies LOF vaginal bleeding, decreased fetal movement or s/s preeclampsia.   Pt have a car seat.     GBS Negative    Hgb 11.6    Wet prep negative, no cmt     1 cm,-1, 25%, mid, soft   Fetal maturity and expectations for fetal movement in the third trimester, how and when to do fetal kick counts and when to call the CNM and contact information reviewed.  Also discussed baldomero nur, early vs. false labor. .and how to time contractions.  She call the CNM line with any concerns.  Anticipatory guidance, signs of symptoms of labor or SROM, third trimester warning signs, when to call and CNM contact information reviewed.    Encouraged to schedule weekly visits to/through her EDB.       DOMENIC Quevedo,CRISTOBAL  3/23/2021 1:17 PM

## 2021-06-16 NOTE — TELEPHONE ENCOUNTER
[facility] be [facility] 72 hours of discharge. When we reached out to mom to confirm home care visit, she stated that she will be taking baby into clinic on Monday April 12 for PEDS follow up and home care is not needed for self.      [facility] will not be seeing this pateint for home care services and will discard this home care referral.            Thanks,   [facility]  [phone]

## 2021-06-16 NOTE — TELEPHONE ENCOUNTER
"TC: Received a page from Vero, 19 yo  at 37/6 weeks.   Vero reports she has been noticing mild cramping down low that feels like \"an ache in her vagina\". She is not sure how often the cramping is coming and she is not sure what to expect with labor as this is her first time.   Denies recent any unusual stress or exertion. She did have intercourse yesterday and has been bouncing on her exercise ball a lot.   Reports good fetal movement, denies vaginal bleeding, leakage of fluid. Denies abnormal vaginal discharge or a changes with urination (Pain, bleeding, urgency).     Discussed that sometimes achy cramps like she is experiencing could be related to a vaginal infection or urinary tract infection or that she could be in early labor. Offered to meet Vero at [facility] for evaluation to assess for signs of early labor or an infection. Vero does not think she has an infection and declines evaluation at this point.     Milagros has an appointment tomorrow with Michelle Carmona CNM, and would like her to evaluate her cervix and will consider an infection check tomorrow.     Reviewed when to call in labor, when contractions are coming every 5 minutes, each lasting for 60 seconds and that pattern being there for 5 minutes. Encouraged Vero to call with these symptoms or with any other concerns like decreased fetal movement, vaginal bleeding, leakage of fluid. Otherwise will present for visit tomorrow     DOMENIC Drummond CNM    "

## 2021-06-16 NOTE — ANESTHESIA PROCEDURE NOTES
Epidural Block    Patient location during procedure: OB    Reason for Block:labor epidural  Staffing:  Performing  Anesthesiologist: Rodolfo Sierra MD  Preanesthetic Checklist  Completed: patient identified, risks, benefits, and alternatives discussed, timeout performed, consent obtained, at patient's request, airway assessed, oxygen available, suction available, emergency drugs available and hand hygiene performed  Procedure  Patient position: sitting  Prep: ChloraPrep and site prepped and draped  Patient monitoring: continuous pulse oximetry, heart rate and blood pressure  Approach: midline  Location: L2-L3  Injection technique: VERONICA saline  Number of Attempts:1  Needle  Needle type: Sammie   Needle gauge: 18 G     Catheter in Space: 5  Assessment  Sensory level:      Events: blood aspirated and See notes below     Additional Notes:  First epidural placed at L3-4 without paresthesia.  Catheter was placed with negative test dose after negative aspiration.  After the test dose, blood was seen in the catheter when open to room air but not to negative pressure aspiration.  The catheter was removed with tip intact and replaced at L2-3 without complications.

## 2021-06-16 NOTE — PATIENT INSTRUCTIONS - HE
"[facility] Nurse Midwives - Contact information:  Appointment line and to get a hold of CNM in clinic Monday-Friday 8 am - 5 pm:  [phone].  There are some clinics with early start times (1st appointment 7:40 am) and others with evening hours (last appointment 6:20 pm).  Most are typically open from 8 am to 5 pm.    CNM on call answering service: [phone].  Specify your hospital of choice and leave a brief message for CNM;  will then page CNM who is on call at your specified hospital and you should receive a call back with 15 minutes.  Be sure that your ringer is audible and that you can accept blocked calls so that we can get back in touch with you! This number should be reserved for urgent needs if during the day, before 8 am, after 5 pm, weekends, holidays.    Contact the on-call CNM with warning signs, such as:    vaginal bleeding     Vaginal discharge and itching or pain and burning during urination    Leg/calf pain or swelling on one side    severe abdominal pain    nausea and vomiting more than 4-5 times a day, or if you are unable to keep anything down    fever more than 100.4 degrees F.       Meet the Midwives from [facility]  You are invited to an informational meet and greet with [facility] Certified Nurse-Midwives. Our free \"Meet the Midwives\" event is a great opportunity to learn about our midwives' philosophy and experience, the hospitals where we can assist with your birth, and answer questions you may have. Partners, friends, and family are welcome to attend. Currently, this is a virtual event.  Date  First Tuesday of every month at 7 pm.    Link to next (live) meeting  [email or web address]://[email or web address]/classes-and-events/meet-the-midwives-from-[facility]-clinics  To Join by Telephone (audio only) Call:   [phone] Phone Conference ID: [phone]#          Touring the Maternity Care Center  At this time we are offering a virtual tour of the " Maternity Care Centers at both [facility] and [facility]:   [facility]: https://www.Kimball.org/Locations/[facility]/Maternity-Care-Center  [facility]:   https://[facility] Health[facility]IMS.org/[facility]-care/the-birthplace/tours  https://www.[facility].org/Locations/[facility]/Maternity-Care-Center/#virtual_tour  When in person tours become available, registrations is required. To schedule a tour at either [facility] or [facility], please do so online using the following links:  [facility] - [email or web address]://[email or web address]/[email or web address]?s=[email or web address]&[email or web address]&[email or web address]&p=[email or web address]&[email or web address]&ps=1&group=37&it=1&ydn=662  [facility] - [email or web address]://www.ZoopShop/registerlist.asp?s=[email or web address]&m=303&vs=5&p=2&wufkw=432&ps=1&group=38&it=1&nem=171    Childbirth and Parenting Education:   [facility] center: http://Silicon RepublicMontefiore Nyack HospitalIntercept Pharmaceuticals/   [phone]  [place]: (education, yoga & wellness) www.Ahaali  Enlightened Mama: www.Q-SenseienedSimple Admit.[employer]   Childbirth collective: (Parent topic nights)  www.childbirthcollective.org/  Hypnobabies:  www.hypnobabiestLumaSense Technologiescities.com/  Hypnobirthing:  Http://hypnobirthing.com/  The Birth Hour: https://thebirthhour.[employer]/online-childbirth-class/    APPS and Podcasts:   Ovia  Glow Nurture  The Birth Hour (for birth stories)   Birthful   Expectful   The Longest Shortest Time  PregnancyPodcast Jany Calderon    Breastfeeding Information:  An excellent 15-minute video on preparing for a good breastfeeding experience, including how to ensure you have a good milk supply and a comfortable latch, can be found here:  https://IQuum.[employer]/      Book Recommendations:   Rufina Yaya's Birthing From Within--first few chapters include a new-age tone, you may prefer to skip it and keep going, because there is good stuff later.  This book recommendation covers emotional preparation, but does cover coping with pain, and use of both pharmacological and nonpharmacological methods.    Dr. Gao' The  "Pregnancy Book and The Birth Book--the pregnancy book goes month-by month    Womanly Art of Breastfeeding by La Leche League International   Breastfeeding by Tyra Robert--great pictures    Mothering Your Nursing Toddler, by Stacey Gonzalez.   Addresses dealing with so many of the challenging behaviors of a nursing toddler.  How Weaning Happens, by La Leche League.  Discusses weaning at all ages, from medically necessary weaning of an infant, all the way up to age 5 (or older), with why/why not, and strategies.  Very empowering book both for deciding to wean and deciding not to.    American College of Nurse-Midwives (ACNM) http://www.midwife.org/; look at the informational handouts at http://www.midwife.org/Share-With-Women     www.mymidwife.org    Mother to Baby (Medication and Herbal guidance in pregnancy): http://www.mothertobaby.org  Toll-Free Hotline: 350.264.6045  LactMed (Medication use while breastfeeding): http://toxnet.nlm.nih.gov/newtoxnet/lactmed.htm    Women's Health.gov:  http://www.womenshealth.gov/a-z-topics/index.html    American pregnancy association - http://americanpregnancy.org    Centering Pregnancy (group prenatal care option): http://centeringhealthcare.org    Information about doulas:  Childbirth collective: http://www.childbirthcollective.org/  Doulas of [place] (ABDELRAHMAN):  www.abdelrahman.org  [facility]  project: http://twincitiesdoulaproject.com/     Early Childhood and Family Education (ECFE):  ECFE offers parents hands-on learning experiences that will nourish a lifetime of teachable moments.  http://ecfe.info/ecfe-home/    March of Dimes www.J[employer].Integrated Media Measurement (IMMI)     FDA - Nutrition  www.mypyramid.gov  Under \"For Consumers,\" click on \"pregnant and breastfeeding women.\"      Centers for Disease Control and Prevention (CDC) - Vaccines : http://www.cdc.gov/vaccines/       When researching information on the web, question the validity of websites.  The domains .gov, .edu and.org tend to " be more reliable information.  If there are a lot of advertisements, be cautious of the information provided. Stay away from blogs and chat rooms please!      Screening Program  Http://www.health.[facility].mn./newbornscreening/  [place] newborns are tested soon after birth for more than 50 hidden, rare disorders, including hearing loss and critical congenital heart disease (CCHD). This site provides resources and information for families and providers.    HEALTHY PREGNANCY CARE: 37 to 41 WEEKS PREGNANT    Talk with your midwife or physician about when to call with signs of labor    Regular uterine contractions that are getting closer together and/or stronger    If you think your water has broken or is leaking    Bleeding from the vagina like a period (bloody vaginal discharge is normal)    If you are not feeling your baby move    Make plans for transportation and  as needed for when you are going to the hospital.    Your midwife or physician may offer to check your cervix for changes.     Ask your health care provider about vaccinations you may need following delivery. By now, you should have received a Tdap immunization to protect against pertussis or whooping cough. Fathers and family members who will be in close contact with the baby should also receive a Tdap shot at least two weeks before the expected birth of the baby if they have not had a Td (tetanus) shot for at least two years.    If you are past your due date, discuss the next steps leading to delivery with your midwife or physician. If you don't start labor on your own by 41 or 42 weeks, your midwife or physician may recommend giving you medicines to ripen your cervix and start labor.  Induction of labor: http://onlinelibrary.garber.com/store/10.1016/j.jmwh.2008.04.018/asset/j.jmwh.2008.04.018.pdf?v=1&t=sogc8abn&l=01hl601s4xe57u79o8m9mz0c888177o0ip1wo510    Preparing for your baby: Tell your midwife or physician how you plan to feed your  baby (breast or bottle), who you have chosen to do pediatric care for your baby, and if you have a boy, whether you have chosen to have him circumcised. You will need a car seat correctly installed in your vehicle to bring your baby home. As you start to set up the nursery at home for your baby, make sure the crib is safe. The mattress needs to fit snugly against the edges of the crib. If you can fit a soda can between the bars, they are too far apart and can allow the baby's head to caught between them.    Learn about infant care and feeding, including information about infant CPR. We recommend that you put your baby to sleep on his or her back to reduce the chance of Sudden Infant Death Syndrome (SIDS). To maintain a healthy environment in which your child can grow, it's best to keep your home smoke-free. By preparing ahead, your transition into parenthood will go smoothly for you and your baby.    Your midwife or physician will want to see you for a checkup 2 to 6 weeks after delivery.    If you have questions about any symptoms you are experiencing or any other concerns, call your provider or their clinic staff at [facility] [place] at Phone: [phone]. If it's after clinic hours, physician patients should call the Care Connection at [phone] ([phone]); midwife patients should call their answering service at [phone].    How can you care for yourself at home?   You can refer to the Starting Out Right book or find it online at http://www.healtheast.org/images/stories/maternity/[facility]-Starting-Out-Right.pdf or http://www.healtheast.org/images/stories/flipbooks/[facility]-starting-out-right/[facility]-starting-out-right.html#p=8     You can sign up for a weekly parenting e-mail that gives support, tips and advice from health care professionals that starts with pregnancy and continues through the toddler years. To register, go to www.healtheast.org/baby at any time during your  pregnancy.        Making Plans for Feeding My Baby    By this point, you probably have read a lot about feeding your baby.  Breastfeed or formula? Each mother s decision is her own and [facility] respects you and your choices. We ve gathered information on both breastfeeding and formula feeding to help with your decision. Talking with your physician or nurse-midwife can also help in your decision.  However you plan to feed your baby, [facility] Maternity Care Centers encourage rooming in with your baby, skin-to-skin contact and feeding your baby based on his or her cues.    Skin-to-skin contact  Being close to mom helps your baby adjust to life outside of the womb.  It helps your baby regulate their body temperature, heart rate, and breathing.  Your baby will usually be placed skin-to-skin immediately following birth or as soon as possible, if medical intervention is needed.    Rooming-In  Having your baby stay with you in your room is called  rooming-in .  Keeping your baby in your room helps you to learn how to care for your baby by getting to know your baby s cues, body rhythms and sleep cycle.       Cue-based feeding  Cues (signals) are baby s way of telling you what he or she wants.  When you learn your infant s cues, you know how to care for and feed your baby.   Feeding cues are the licking and smacking of lips, bringing their fist to their mouth, and a reflex called  rooting - where baby turns and opens his or her mouth, searching for the breast or bottle.  Crying is a late feeding cue.  Babies can feed frequently, often at least 8 times in 24 hours.    Breastfeeding facts  Breast milk is the best source of nutrition for your baby and is available at birth. In the first couple of days, your milk volume is already starting to increase, though it may not be noticeable. Breastfeed frequently to increase your milk supply. Within three to five days, you will begin to notice larger milk volumes. An increase in  breast size, heaviness and firmness are often described as the milk  coming in.  Frequent breastfeeding can help breasts from getting overly firm and painful. You will know the baby is getting enough milk if your baby is having wet and dirty diapers and gaining weight.     If your goal is to exclusively breastfeed, it is important to not use any formula or artificial nipples (including bottles and pacifiers) while your baby is learning to breastfeed.  While it may seem like an  easy  option to give your baby a bottle, formula should only be given if there is a medical reason for your baby to have it.      Positioning and attachment   Get comfortable.  Use pillows as needed to support your arms and baby.  Hold baby close at the level of your breast, facing you in a tummy to tummy position.  Skin to skin helps with this.  Position the baby with his or her nose by the nipple.  There should be a straight line from baby s ear to shoulder to hips.  Tickle your baby s lips or wait for baby to open mouth wide, bring baby to breast by leading with the chin.  Aim the nipple at the roof of baby s mouth.  A rapid sucking pattern is followed by longer, drawing pattern with occasional swallows heard.  When baby is correctly latched, your nipple and much of the areola are pulled well into baby s mouth.      Returning to work or school  Focus on a good start to breastfeeding.  Many women continue to provide breastmilk for their baby when they return to work or school.  Making plans about where to pump and store milk can make the transition go well.  Talk with other mothers who have also returned to work or school for tips and support.  Your employer s Human Resource department may be a resource as well.     Returning to work or school: (continued)     babies can mean fewer  sick  days for you.    A quality breast pump will also save time and add comfort.  Check with your insurance prior to giving birth for breast pump  coverage.  Many insurance companies include a pump within your benefits.    Wait until your baby is at least three weeks old to introduce a bottle for the first time.  Have someone besides you give the bottle.    Breastfeed when you are with your baby. Reserve your bottles of breast milk for when you are away.     Your breasts will need to be  emptied  either by your baby or a pump.  Plan to pump at least twice in an eight hour day.    If you cannot pump at work, continue breastfeeding at home. Any amount of breast milk is worth giving to your baby.    Formula feeding facts  If you are planning to use formula to feed your baby, you will want to make some preparations ahead of time. Talk to your doctor or nurse-midwife about what type of formula to use. Some are iron-fortified, meaning they have extra iron in them. You will want to purchase formula and bottles before your baby is born to be sure you are ready after you return from the hospital. The [facility] do not provide formula samples to take home.    Be sure to follow formula mixing directions closely. Regular milk in the dairy case at the grocery store should not be given to babies under 1 year old. Baby formula is sold in several forms including:    Ready-to-use. This is the most expensive, but no mixing is necessary.    Concentrated liquid. This is less expensive than ready-to-use and you mix with water.    Powder. This is the least expensive. You mix one level scoop of powdered formula with two ounces of water and stir well.    Most babies need 2.5 ounces of formula per pound of body weight each day. This means an 8-pound baby may drink about 20 ounces of formula a day; however, this is just an estimate. The most important thing is to pay attention to your baby s cues.  If your baby is always fussy, needs more iron or has certain food allergies, your physician may suggest you change your baby s formula to a different kind.     How do I warm my  baby s bottles?  You may feed your baby a bottle without warming it first. It is OK for the breast milk or formula to be cool or room temperature. If your baby seems to prefer it warmed, you can put the filled bottle in a container of warm water and let it stand for a few minutes. Check the temperature of the liquid on your skin before feeding it to your baby; to be sure it isn t too hot. Do not heat bottles in the microwave. Microwaves heat food and liquids unevenly, and this can cause hot spots that can burn your baby.    How do I clean and sterilize bottles?  Sterilize bottles and nipples before you use them for the first time. You can do this by putting them in boiling water for 5 minutes. After that first time, you can wash them in hot and soapy water. Rinse them carefully to be sure there is no soap left on them. You can also wash them in the .    Care Connection [phone] ([phone])    Baby Café    Pregnant and interested in breastfeeding?  Need answers to breastfeeding questions?  Want to help breastfeeding moms?  Already breastfeeding and want to meet other moms?    Join us at the Baby Café!    Baby Cafe is a free, drop-in service offering breast-feeding support for pregnant women, breast-feeding mothers and their families.  Come share tips and socialize with other mothers.  Babies and siblings are welcome (no childcare available).    Starting April 2018, Baby Café will be at 4 locations.  Please see below for the Baby Café closest to you!      [facility]  [address] [place] [address]  1st Wednesday: 10am-12pm    [facility]  [address] [place] [address]  3rd Wednesday 4-6pm    [facility]  [address] [place] [address]  4th Wednesday 10am-12:30pm    [employer] [facility]  [address] [place] [address]  4th Wednesdays: 4-6pm      Hmong, Maldivian, and Haitian which is may be available at some sites.    For more information,  "please contact: [email or web address].[email or web address]. or [phone]      Virtual Breastfeeding Support:    During this time of isolation, breastfeeding families need even more community!  Here are some area organizations offering virtual support groups for breastfeeding:      Lat Cafe Support Group,  at 10:30 am   Run by KIRAN Cosme of [employer]   Go to [employer] Facebook page and click on \"events\" for link   [email or web address]://[email or web address]    [facility] Milk Hour,  at 2:30 pm    Run by KIRAN Gonzalez   Go to [facility] FB page and send message to get link   https://www.HealthcareMagic.[employer]/Mindwork Labsfoundations/    [facility] holding virtual meetings the first Tuesday of each month, 8-9 pm, and the   Third Saturday, 10 - 11 am.  Go to [facility] [place] FB page; message to get link [email or web address]://www.[email or web address].[employer]/LLLofGolden[place]ley/?hc_location=[facility] offers a Lactation Lounge every Friday 12pm - 1pm, run by Kate Mc[facility] Leader   Sign up via link at Element ID/cbe-lactation   https://www.Element ID/cbe-lactation    [facility] is offering virtual support groups every Monday, 10:30 am - 12 pm, run by nurse IBCLC   Https://www.[email or web address]/    Prenatal Breastfeeding Classes:        [employer] is offering virtual breastfeeding and  care classes:  https://www.Element ID/education-workshops    [employer]irth[employer] childbirth and breastfeeding education offering virtual prenatal breastfeeding classes  https://www.birthedmn.com/workshops        "

## 2021-06-16 NOTE — ANESTHESIA POSTPROCEDURE EVALUATION
Patient: Vero SANDERS Lima  * No procedures listed *  Anesthesia type: epidural    [place] [facility]  Last vitals: No vitals data found for the desired time range.    Post vital signs: stable  Level of consciousness: awake and responds to simple questions  Post-anesthesia pain: pain controlled  Post-anesthesia nausea and vomiting: no  Pulmonary: unassisted, return to baseline  Cardiovascular: stable and blood pressure at baseline  Hydration: adequate  Anesthetic events: no    QCDR Measures:  ASA# 11 - Blanca-op Cardiac Arrest: ASA11B - Patient did NOT experience unanticipated cardiac arrest  ASA# 12 - Blanca-op Mortality Rate: ASA12B - Patient did NOT die  ASA# 13 - PACU Re-Intubation Rate: NA - No ETT / LMA used for case  ASA# 10 - Composite Anes Safety: ASA10A - No serious adverse event    Additional Notes: neuro intact

## 2021-06-16 NOTE — ANESTHESIA PREPROCEDURE EVALUATION
Anesthesia Evaluation      Patient summary reviewed   No history of anesthetic complications     Airway   Neck ROM: full  Comment: feasible   Pulmonary - negative ROS                          Cardiovascular - negative ROS  Exercise tolerance: > or = 4 METS   Neuro/Psych    (+) depression,     Comments: Migraines    Endo/Other    (+) obesity (BMI 35), pregnant     GI/Hepatic/Renal - negative ROS           Dental                         Anesthesia Plan  Planned anesthetic: epidural    ASA 2     Anesthetic plan and risks discussed with: patient    Post-op plan: epidural analgesia

## 2021-06-16 NOTE — PROGRESS NOTES
Vero Lima is here with Catina for a routine prenatal visit at 40w1d.  She is ready to be done and wishes to have a cervical check today, has had some periods of contractions that do not last and nothing enough to call or come in for evaluation  Fetal movement is normal.  SVE 4/60/-2 soft, anterior, vertex  Reyna score is 9, discussed pitocin versus AROM and timing of IOL after patient expresses interest once knowing exam.   She wishes to wait thorugh the end of the week at least and requests Sunday or Monday   Advised she do fetal movement counts daily.  Anticipatory guidance, signs of symptoms of labor or SROM, third trimester warning signs, when to call and CNM contact information reviewed.   RTC in 1 week    Patient wishes to schedule elective IOL at [facility] versus [facility] due to location  IOL scheduled Friday at 0700 pitocin or AROM  Call unit at 0600 to double check and present shortly after 0700  Covid test ordered  Patient will expect call for testing      DOMENIC Taveras CNPAPI

## 2021-06-21 NOTE — LETTER
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  I have placed a referral to physical therapy for diagnosis and treatment.  She is able to work sitting or with accommodations to avoid standing/walking, but will otherwise need to be off work for the time being.  She also needs help with transportation assistance to get to and from physical therapy appointments.        Please feel free to reach out to our office for any further information as needed.    Sincerely,  DOMENIC Moore CNM, [facility]  10/16/2020 2:23 PM

## 2021-06-25 NOTE — TELEPHONE ENCOUNTER
Pt needs a return to work letter today - as she is returning to work today. Per pt, she needs no restrictions. Pls put in her MyChart so she can show her employer. Call pt if there are any questions.

## 2021-06-25 NOTE — TELEPHONE ENCOUNTER
No, cannot sign a letter to return to work if we haven't seen her for a postpartum visit.   Penelope

## 2021-06-26 NOTE — PATIENT INSTRUCTIONS - HE
POSTPARTUM INFORMATION AND RESOURCES:    Congratulations on the birth of your baby. We have gathered together some information on staying healthy in the postpartum time including information on exercise, nutrition and mental health. We have also listed some local and national resources for lactation support and mental health support.    If you have questions or concerns and need to speak with a midwife immediately, please call the on-call midwife at [phone].    If you have a non-emergent question or would like to schedule a follow up appointment, please call the clinic midwife at [phone].    Thank you for sharing your birth experience with the [facility].  Congratulations on the birth of your baby!    ---------------------------------------------------------------------------------------------------------  EXERCISE & NUTRITION:     Getting and Staying Active: A Healthy You!   -Why should I exercise? Exercise, being physically active, is very important for all women. Being active can help you:   Lose or maintain weight   Have more energy   Sleep better   Enjoy sex more   Relieve stress and think better   Lower the chance you will have heart disease, high blood pressure, and diabetes   Strengthen your bones and muscles   Have fewer hot flashes if you are older   -How active do I have to be to get the bene?ts of exercise?  Studies show that as little as 15 minutes of moderate exercise--like fast walking or dancing--3 times a week can improve the health of your heart. Ifyou want to get the best benefits from exercise, try to increase your physical activity to at least 30 minutes, 5 times a week. If you have a serious health problem,be sure to talk with your health care provider before starting an exercise program.   Https://[facility]/  Http://www.Sputnik8/    @PelvicGuru1  @MyPelvicFloorMuscles  @The.Vagina.Whreba    Taking Care of your Health: Health Care Maintenance Screening  recommendations   In all the things women do, taking care of everything and everybody else, they sometimes forget to take care of themselves. This handout reviews the health screenings and vaccines that are recommended for women of all ages. Talk with yourhealth care provider about which tests and vaccines you need. The chart below lists the screening tests and vaccinations recommended for healthy women who do not have a high risk for most diseases.   The recommendations in thischart are guidelines only. For some tests and vaccines, the chart says you should talk to your health care provider.This is because the best recommendations for you depend on your personal health history. Your health care provider may suggest more frequent testing or additional tests ifyou havea higher chance of getting some diseases     Planning Your Family: Developing a Reproductive Life Plan   Planning ahead can help you avoid getting pregnant when you don t want to be pregnant and also be in good health if and when you do decide to become pregnant. Many women have at least one pregnancy in their lives, even if it was not planned. In fact, about half of all pregnancies are not planned. Getting pregnant when you did not plan it can be a problem, or it can turn into a happy event. Planning pregnancy leads to healthier pregnancies, healthier mothers, and healthier families.   Although many women want to have a family, not everyone wants to have children. More and more women are childless by choice (also known as childfree). Whether to have children is a personal choice that only you can make. It s okay not to want children! If you never want to get pregnant, it is important to make sure you always use very effective birth control, such as an intrauterine device, the birth control implant, female sterilization (having your tubes tied), or your partner having a vasectomy.     Reliable resources:   ?   American College of Nurse-Midwives (ACNM)  "http://www.midwife.org/; look at the informational handouts at http://www.midwife.org/Share-With-Women   ??   Women's Health.gov:   http://www.womenshealth.gov/a-z-topics/index.html   FDA - Nutrition ?www.mypyramid.gov? Under \"For Consumers,\" click on \"pregnant and breastfeeding women.\"   ?   Vaccines : Centers for Disease Control and Prevention (CDC) http://www.cdc.gov/vaccines/   ?   [facility] www.ChadwickPerceptive Pixel.DialMyApp   ?   When researching information on the web, question the validity of websites.? [facility] .gov, [facility] and.org tend to be more reliable information.? If there are a lot of advertisements, be cautious of the information provided. Stay away from blogs and chat rooms please!   ?   ?   Nutrition and supplements:   Daily multivitamin vitamin (with 400 - 1000 mcg folic acid).? Take with food as needed.   ?   4-5 servings of dairy or other calcium rich foods (fish, leafy greens, soy)?per day - if not, take 500-1000 mg additional calcium (Tums, pills, chews). Take with dairy.   ?   Vitamin D3 4000 IU geltab daily. Vitamin D rich foods: Cod Liver Oil (1Tbsp), Kell, Mackerel, Tuna, fortified milk and yogurt, Beef and liver, sardines, egg, fortified cereals, swiss cheese.? Take supplements with fattiest meal.?   ?   2-3 (4) oz servings of fish, seafood, nuts (walnuts & almonds), oils, avocado per week - if not, take Omega 3 Fatty acids: DHA & HELENE 3086-2821 mg per day. ?Other names: cod liver oil, fish oil. Take with fattiest meal.   ?   ?---------------------------------------------------------------------------------------------------------  POSTPARTUM & LACTATION RESOURCES :    Virtual Breastfeeding Support:    During this time of isolation, breastfeeding families need even more community!  Here are some area organizations offering virtual support groups for breastfeeding:      [facility] Cafe Support Group, Tuesdays at 10:30 am   Run by Eliza Haas IBABDIRASHID of The [facility] Lactation Consultants   Go to The " "Baby Whisperer Lactation Consultants Facebook page and click on \"events\" for link   https://www.InVisioneer.[email or web address]/    [facility] Milk Hour,  at 2:30 pm    Run by Pablito Pitts IBCLC   Go to [facility] FB page and send message to get link   https://www.InVisioneer.Tripshare/healthfoundations/    [facility] holding virtual meetings the first Tuesday of each month, 8-9 pm, and the   Third Saturday, 10 - 11 am.  Go to [facility] FB page; message to get link [email or web address]/[email or web address]/?hc_[email or web address]=[facility] offers a Lactation Lounge every Friday 12pm - 1pm, run by Shanna Mcfadden Abraham Leader   Sign up via link at [employer]/cbe-lactation   https://www.[employer]/cbe-lactation    [facility] is offering virtual support groups every Monday, 10:30 am - 12 pm, run by nurse IBCLC   Https://www.InVisioneer.[email or web address]/events/[email or web address]/    Prenatal Breastfeeding Classes:        [employer] is offering virtual breastfeeding and  care classes:  https://www.[employer]/education-workshops    BirthEd childbirth and breastfeeding education offering virtual prenatal breastfeeding classes  https://www.birthedmn.com/workshops    Breastfeeding:   OUTPATIENT LACTATION RESOURCES   -Schedule an appointment with a [facility] CRISTOBAL who is also a Lactation Consultant by calling [phone]. Natalya Dhillon IBABDIRASHID, CNM at [facility] on Monday, Lisa Solorio CNM at [facility] on  and [facility] on .   [facility] Lactation Clinics located at [facility] and [facility]   Call: [phone].     Inpatient support     Outpatient appointments     Telephone consultation     Breast-feeding classes available through [employer]     [facility]/WIC/[facility] health improvement partnership: "       Baby Café    Pregnant and interested in breastfeeding?  Need answers to breastfeeding questions?  Want to help breastfeeding moms?  Already breastfeeding and want to meet other moms?    Join us at the Baby Café!    Baby Cafe is a free, drop-in service offering breast-feeding support for pregnant women, breast-feeding mothers and their families.  Come share tips and socialize with other mothers.  Babies and siblings are welcome (no childcare available).    Starting April 2018, Baby Café will be at 4 locations.  Please see below for the Baby Café closest to you!      [facility]  [address] [place] [address]  1st Wednesday: 10am-12pm    [facility]  [address] [place] [address]  3rd Wednesday 4-6pm    [facility]  [address] [place] [address]  4th Wednesday 10am-12:30pm    "[employer]  [address] [place] [address]  4th Wednesdays: 4-6pm      Hmong, Cambodian, and Moroccan which is may be available at some sites.    For more information, please contact: [email or web address]. or [phone]    -[facility]:  www.[facility] HospitalarentingAshtabula General Hospitaler.Nellix   -Attend a baby weigh in at [place]. Lactation consultants are available to answer questions   [place]: Tuesdays 1:00 - 2:00   [facility]: Mondays 1:00 - 2:00   -Attend a New Mamma group or a Second Time Mama group at [place].     -Enlightened Mama: www.enlightenedmama.com   -Attend one of the New Mama groups at [facility] in [facility]. [facility] also offers one-on-one in home and in office lactation consults.     -Eric: www.jacinto.org/   -Attend a Winnie League meeting. Multiple groups in several locations throughout the [facility]. The meetings are no-cost and always informative breastfeeding education session through [employer] La Leche League    Held at [facility] the second Thursday of each month at 7pm    - Information on  "medication use while breastfeeding: http://toxnet.nlm.nih.gov/newtoxnet/lactmed.htm     Other Online Breastfeeding Resources:     [facility].org/baby sign up for free online weekly e-mail     [facility].org/maternity     Breastfeedingmadesimple.com     Garlandli.org (La Leche League)     Normalfed.com     Womenshealth.gov/breastfeeding     Workandpump.com     Traxian    https://Parchment/abcs/   Click on \"Learn More About Attachment\"    -New Parent Connection Drop-In:  In collaboration with [place], Early Childhood Family Education (ECFE), a program of [address], offers ongoing classes for new parents in their infants.  The connection classes offer support and resources to parents during the exciting and challenging period of transition following the birth of her baby.  Parents and babies (birth to 6 months of age) may join the group at any time.  Baby's birth to 3 months meet , from 1230 to 1:30 PM  Babies 3-6 months meet , from 4 to 5 PM    -[employer] New Mama Group: www.Mytopia/free-new-mama-group  -Attend [employer] New Mama. Groups located at three locations:  [place], [place] and [place]. Sign up online.       Additional Resources:?   -American College of Nurse-Midwives (ACNM) http://www.midwife.org/; look at the informational handouts at http://www.midwife.org/Share-With-Women  www.mymidwife.org   ?   -Women's Health.gov:   http://www.womenshealth.gov/a-z-topics/index.html   ?   -Early Childhood and Family Education (ECFE):   ECFE offers parents hands-on learning experiences that will nourish a lifetime of teachable moments.   http://ecfe.info/ecfe-home/       -----------------------------------------------------------------------------------------------------------   (Before, during and after pregnancy)   MOOD DISORDER RESOURCES :    Are you feeling sad or depressed?     Do you feel more irritable or angry with those around you? "     Are you having difficulty bonding with your baby?     Do you feel anxious or panicky?     Are you having problems with eating or sleeping?     Are you having upsetting thoughts that you can t get out of your mind?     Do you feel as if you are  out of control  or  going crazy ?     Do you feel like you never should have become a mother?     Are you worried that you might hurt your baby or yourself?    Any of these symptoms, and many more, could indicate that you have a form of  mood or anxiety disorder, such as postpartum depression. While many women experience some mild mood changes during or after the birth of a child, 15 to 20% of women experience more significant symptoms of depression or anxiety. Please know that with informed care you can prevent a worsening of these symptoms and can fully recover. There is no reason to continue to suffer.  Women of every culture, age, income level and race can develop  mood and anxiety disorders. Symptoms can appear any time during pregnancy and the first 12 months after childbirth. There are effective and well-researched treatment options to help you recover. Although the term  postpartum depression  is most often used, there are actually several forms of illness that women may experience.    Resources:    -Pregnancy and Postpartum Support [place]: www.[facility] Psych[facility] Centerupportmn.org  Who We Are: We are a group of mental health &  practitioners, service organizations, and mother volunteers who provides services to those struggling with a pregnancy, loss, or postpartum mood disorder through the Helpline, professional training, our resource list and website.  What We Do: We provide support, advocacy, awareness, and training about  mental health in [place].     Community Resource List:   This is a list of  resources within our community.   http://[facility] Psychiatric Centerupportmn.org/communityresourcelist    PSYCHOTHERAPISTS/CONSULTANTS   This is a list  "of licensed mental health professionals who have advanced clinical skills in the treatment of postpartum mood and anxiety disorders (PMADs).   Http://[facility].org/mentalhealthproviderresourcelist   INTEGRATIVE MEDICINE PRACTITIONERS:   This is a list of licensed providers who practice Integrative Medicine: a form of treatment that combines alternative medicine with evidence based medicine in an effort to treat the  whole person  (the person, not just the disease).   http://[facility].org/integrativemedicineproviders    PSYCHIATRIC CARE   This is a list of licensed Psychiatrists who have advanced clinical skills in the treatment and medication management for PMADs and lactating mothers.   Http://[facility].org/psychiatryproviderresroucelist   Click on the links below to find detailed profiles and contact information of providers who have been screened and approved as having advanced training in the area of PMADs. Please note: [facility] does not endorse a specific provider.   The list is in alphabetical order by city. You can also search for providers by city or zip code using the search box. Please click on the \"Show\" box to the upper right to advance to the next page. You may also call our Helpline at [phone] if you would like for our Helpline volunteer to find a provider for you.    -Postpartum Depression Support Group:   Weekly groups at no cost through [facility], , 1:30-3:00 p.m., at [facility], [address], [address]. [place], , 1:30-3:00 p.m., at [facility], [address] To register for the group or get more information, call [phone].   -Postpartum Depression Support Group:   Outpatient Intensive Treatment program for women with  mood disorders [facility]     -[facility]  Resource Guide     Women s Mental Health at [place] " [facility]  This website provides a range of current information including discussion of new research findings in women s mental health and how such investigations inform day-to-day clinical practice. Despite the growing number of studies being conducted in women s health, the clinical implications of such work are frequently controversial, leaving patients with questions regarding the most appropriate path to follow. Providing these resources to patients and their doctors so that individual clinical decisions can be made in a thoughtful and collaborative fashion dovetails with the mission of our Center.    https://[facility].org/      If you re having thoughts of harming yourself or your baby, it is vital to get support immediately. Call 911 or go to the nearest E.R.     TOLL-FREE / NATIONWIDE EMERGENCY ASSISTANCE   Immediate Emergency:  911   National Suicide Prevention Lifeline:   1-426.785.1529   Suicide Prevention Hotline:   1-716-FYLFGWQ   National Postpartum Depression Hotline:   1-800-PPD-MOMS   Postpartum Support International (PSI)   PPD Helpline: (not a 24-hour hotline)   1-744.937.9647

## 2021-06-26 NOTE — PROGRESS NOTES
Postpartum Visit:     Assessment:   Normal postpartum exam at ~ 8 weeks.  Formula-feeding mother  Contraceptive Counseling- IUD consult  Patient sustained bilateral periurethral tears and a first-degree vaginal laceration  Gestational hypertension in labor- BP normal today.  Cervical cancer screening due, defers now    Plan:   1. Adjustment to parenting, self care and importance of a support system discussed. Postpartum education given including: postpartum mood changes and postpartum depression. [place]  Mental Health Resource Card given for future reference prn.   2. Return of fertility discussed. Plans Mirena IUD for contraception. IUD consult done. Reviewed importance of no unprotected intercourse for 2 weeks prior to insertion appointment.  Encouraged to call insurance for coverage.  Education given on this method. Resumption of intercourse reviewed with possible changes in libido and vaginal lubrication.  3. Discussed resumption of exercise and normal timing of return to pre-pregnancy weight. Postpartum physical activity reviewed and encouraged modified abdominal crunches and Kegels daily. Encouraged integrating exercise, such as walking 20-30mns daily.   4.  Nutrition and supplements reviewed.  Advised continuation of a prenatal or multivitamin, also Vitamin D3 2000 IU geltab daily and an omega 3 fatty acid supplement.  5. Reviewed warning signs of pelvic pain, excessive bleeding or abdominal pain, fever/chills, or signs of breast infection.   6.  Assisted patient with scheduling a follow-up visit with CNM next week for Pap smear and IUD insertion.  7.  Return to work note given as requested.    DOMENIC García, CRISTOBAL    Subjective:    Vero Lima is a 21 y.o. female who presents for postpartum visit. She is 6 weeks postpartum following a NSVB.  I have fully reviewed the prenatal and intrapartum course. The delivery was at 40 weeks gestation Her baby boy is named Catina Jackson and weighed 7 lbs 4 oz  "at birth.  Postpartum course has been stable. Baby's course has been stable. Baby is formula-feeding. Lochia ceased at 4 weeks postpartum.  Bowel function is normal. Bladder function is normal. She has resumed intercourse with condoms. Desired contraception: IUD. Appetite is normal. Reports sleeping normal. [place]  Depression Scale postpartum depression screening score: 1. She has a history of depression and is aware of symptoms.     She has resumed regular exercise. Vero returned to work a few weeks ago. Needs return to work note.     Review of Systems:  -A 12 point comprehensive review of systems was negative except as noted above.  -Prenatal history and intrapartum course were also reviewed today.    Cervical Cancer Screening History:  Last Pap: never.     Objective:      Vitals:    21 1532   BP: 118/66   Pulse: 72   Weight: 186 lb (84.4 kg)   Height: 5' 6\" (1.676 m)       Physical Exam:  General Appearance: Pleasant, articulate, well-groomed, well-nourished female.  Not in any apparent distress.   Breasts: Engorgement resolved.  Nipples intact with no cracking.  Abdomen: Soft, non-tender. No masses. 1 fb diastasis present.   Pelvic: Deferred today- pt prefers to await until next week when she returns for IUD insertion.   Extremities: Extremities normal without varicosities or edema               "

## 2021-06-29 NOTE — PROGRESS NOTES
"Progress Notes by Francisca Mack at 2020  3:00 PM     Author: Francisca Mack Service: -- Author Type: Medical Student    Filed: 2020  5:32 PM Encounter Date: 2020 Status: Attested    : Francisca Mack (Medical Student) Cosigner: Michelle Carmona APRN, CNM at 2020  5:32 PM    Attestation signed by Michelle Carmona APRN, CNM at 2020  5:32 PM    Patient was seen with student,  who was present for learning. I personally assessed, examined and made clinical decisions reflected in the documentation.  DOMENIC Quevedo CNM                  PRENATAL VISIT   FIRST OBSTETRICAL EXAM - OB   Assessment / Impression   First prenatal visit at [facility]   21yo     Last pap = never had- due for one. Declines now, desires postpartum.   EDPS = 0, \"never\" to #10  Plan:      -IOB labs drawn. Hgb A1C collected.   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  -Pt is is not interested in drawing lead level.   -Patient is interested in waterbirth. HIV and Hep C drawn today.   -Reviewed prenatal care schedule.   -Optimal nutrition and weight gain discussed. Pregnancy weight gain of 15-25 lbs (BMI 25.0-29.9) encouraged. Advised starting iron supplementation in addition to prenatal when tolerated.   -Anticipatory guidance for common pregnancy questions and concerns reviewed.   -Danger s/sx for this trimester reviewed with patient.   -Reviewed genetic screening options with patient, patient is going to review information on genetic screening options and check with insurance.   -Reviewed carrier testing options with patient, patient does not elect for testing or referral to genetic counseling.  -IOB packet given and reviewed with patient.   -CNM services and hospital options reviewed; emergency and scheduling phone numbers given to patient.     The patient has the following high risk factors for preeclampsia: None. Therefore, low-dose aspirin will not be " initiated.    The patient has the following moderate risk factors for preeclampsia:Sociodemographic characteristics (-American, low SES).  Because the patient .does not have two or more risk factors, low-dose aspirin will not be initiated   -Antepartum VTE risk factors absent.  -Patient is at increased risk for overt diabetes with immediate family history of diabetes and increased BMI, so A1C will be added to IOB labs today.  -Pt is nota candidate for an antepartum OB consult.    -Return to clinic in 4 weeks or sooner as needed.  Total time spent with patient: 50 minutes, >50% time spent counseling and coordinating care.   Subjective:   Vero Lima is a 20 y.o.  here today for her first obstetrical exam at [facility]. Here by herself. This pregnancy is planned Attempting pregnancy for 0 months. The patient reports nausea with vomiting (now controlled with zofran)and fatigue. Patient's last menstrual period was 2020., but this LMP was prior to her miscarriage in  for which she had been seen multiple times in the ED for bleeding- Hcg levels tracked and US done to confirm. She had bleeding for the month after her miscarriage and became pregnant during this time. She believes she is about 6 or 7 weeks.  Her previous three cycles were normal. Her pregnancy will be dated by early US (being done after this appointment). Has been taking prenatal vitamins regularly. Was seen yesterday for N/V in pregnancy and prescribed zofran. States it is helping and able to keep food and water down.   The patient states that she is in a monogamous relationship and states that she is safe.. Offered GC/CT screening today and patient accepts.  Current symptoms also include: frequent urination, morning sickness, nausea and positive home pregnancy test. And positive pregnancy test in ED.   Risk factors: none. Pregnancy Risk Factors: first trimester loss x1, history of depression and anxiety   VTE antepartum risk  factors (two or more risk factors, or 1 * risk factor, places patient at higher risk): none.   Clinical history/risk factors requiring antepartum OB consult: none.   The patient has the following risk factors for overt diabetes: Body mass index greater than or equal to 25 kg/m2. Plus the additional risk factor(s) of: First-degree relative with diabetes and High-risk race/ethnicity (eg, , , ,  American, ).  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   OB History    Para Term  AB Living   2             SAB TAB Ectopic Multiple Live Births                  # Outcome Date GA Lbr Piyush/2nd Weight Sex Delivery Anes PTL Lv   2 Current            1                History:   History- Depression and Anxiety: used to do talk therapy. Coping well now.    No past surgical history on file.   Family History   Problem Relation Age of Onset   ? Depression Mother    ? Diabetes Mother    ? Hypertension Mother       Social History     Tobacco Use   ? Smoking status: Former Smoker   ? Smokeless tobacco: Never Used   Substance Use Topics   ? Alcohol use: Yes     Comment: 1 per month   ? Drug use: Not Currently      Current Outpatient Medications   Medication Sig Dispense Refill   ? ondansetron (ZOFRAN) 4 MG tablet Take 1 tablet (4 mg total) by mouth every 8 (eight) hours as needed for nausea. 30 tablet 1   ? PRENATAL VITAMIN 27 mg iron- 0.8 mg Tab tablet Take 1 tablet by mouth daily.     ? desogestreL-ethinyl estradioL (APRI) 0.15-0.03 mg per tablet One tab twice daily until bleeding stops Do not use smokeless tobacco or smoke while taking this medication. 1 Package 0     No current facility-administered medications for this visit.       Allergies   Allergen Reactions   ? Tomato (Solanum Lycopersicum) Hives      The patient's medical, surgical and family  "histories were reviewed.  Pap smear: Last Pap: MORELIA Is 20 years old and due for PAP. Given her recent miscarriage she feels more comfortable doing PAP in the postpartum period because of the possibility of spotting.       EPDS score today: 0.\"never\" to #10   History of anxiety or depression: yes    Review of Systems   General: Fatigue but otherwise denies problem   Eyes: Denies problem   Ears/Nose/Throat: Denies problem   Cardiovascular: Denies problem   Respiratory: Denies problem   Gastrointestinal: nausea and vomiting controlled with zofran  Genitourinary: Denies any discharge, vaginal bleeding or itchiness or any other problem   Musculoskeletal:  denies problem   Skin: Denies problem   Neurologic: Denies problem   Psychiatric: Denies problem   Endocrine: Denies problem   Heme/Lymphatic: Denies problem   Allergic/Immunologic: Denies problem       Objective:   Objective    Vitals:    08/18/20 1456   BP: 120/64   Pulse: 76   Weight: 180 lb (81.6 kg)   Height: 5' 6\" (1.676 m)      Physical Exam:   General Appearance: Alert, cooperative, no distress, appears stated age   HEENT: Normocephalic, without obvious abnormality, atraumatic. Conjunctiva/corneas clear, does not wear corrective lenses   Neck: Supple, symmetrical, trachea midline, no adenopathy.   Thyroid: not enlarged, symmetric, no tenderness/mass/nodules   Back: Symmetric, no curvature, ROM normal, no CVA tenderness   Lungs: Clear to auscultation bilaterally, respirations unlabored   Heart: Regular rate and rhythm, S1 and S2 normal, no murmur, rub, or gallop. No edema to lower extremities.   Breasts: Declined- denies concerns.   FHT: too early for FHT   Pelvic: not indicated- deferred  Musculoskeletal: Extremities normal, atraumatic, no cyanosis   Skin: Skin color, texture, turgor normal, no rashes or lesions   Lymph nodes: Cervical, supraclavicular, and axillary nodes normal   Neurologic: Alert and oriented x 3. Normal speech   Lab:   Results for orders " placed or performed during the hospital encounter of 08/02/20   Urinalysis-UC if Indicated for patients > 12 years   Result Value Ref Range    Color, UA Yellow Colorless, Yellow, Straw, Light Yellow    Clarity, UA Clear Clear    Glucose, UA Negative Negative    Bilirubin, UA Negative Negative    Ketones, UA Negative Negative, 60 mg/dL    Specific Gravity, UA 1.018 1.001 - 1.030    Blood, UA Negative Negative    pH, UA 7.0 4.5 - 8.0    Protein, UA Trace (!) Negative mg/dL    Urobilinogen, UA 2.0 E.U./dL <2.0 E.U./dL, 2.0 E.U./dL    Nitrite, UA Negative Negative    Leukocytes, UA Negative Negative    Bacteria, UA None Seen None Seen hpf    RBC, UA 0-2 None Seen, 0-2 hpf    WBC, UA 0-5 None Seen, 0-5 hpf    Squam Epithel, UA 25-50 (!) None Seen, 0-5 lpf    Mucus, UA Few (!) None Seen lpf   HCG, Quant   Result Value Ref Range    Beta-hCG, Quantitative 2,179 (H) 0 - 4 mlU/mL   POCT pregnancy, urine   Result Value Ref Range    POC Preg, Urine Positive (!) Negative    POCT Kit Lot Number [id number]     POCT Kit Expiration Date 2021-10-31     Pos Control Valid Control Valid Control    Neg Control Valid Control Valid Control   SERENA Stokes

## 2021-07-03 NOTE — ADDENDUM NOTE
Addendum Note by Michelle Mtz APRN, CNM at 8/18/2020  3:00 PM     Author: Michelle Mtz APRN, CNM Service: -- Author Type: Midwife    Filed: 8/19/2020 12:43 PM Encounter Date: 8/18/2020 Status: Signed    : Michelle Mtz APRN, CNM (Midwife)    Addended by: MICHELLE MTZ on: 8/19/2020 12:43 PM        Modules accepted: Orders

## 2021-07-03 NOTE — ADDENDUM NOTE
Addendum Note by Louie Llanos CMA at 8/18/2020  3:00 PM     Author: Louie Llanos CMA Service: -- Author Type: Certified Medical Assistant    Filed: 8/19/2020  9:04 AM Encounter Date: 8/18/2020 Status: Signed    : Louie Llanos CMA (Certified Medical Assistant)    Addended by: LOUIE LLANOS on: 8/19/2020 09:04 AM        Modules accepted: Orders

## 2021-07-04 NOTE — LETTER
Letter by Natalya Dhillon APRN, CNM at      Author: Natalya Dhillon APRN, CNM Service: -- Author Type: --    Filed:  Encounter Date: 6/7/2021 Status: (Other)         June 7, 2021     Patient: Vero SANDERS Lima   YOB: 2000   Date of Visit: 6/7/2021       To Whom It May Concern:    It is my medical opinion that Vero Lima may return to full duty immediately with no restrictions.    If you have any questions or concerns, please don't hesitate to call.    Sincerely,        Electronically signed by DOMENIC Mcmahon CNM

## 2021-07-04 NOTE — LETTER
Letter by Penelope Goldberg APRN, CNM at      Author: Penelope Goldberg APRN, CNM Service: -- Author Type: --    Filed:  Encounter Date: 5/28/2021 Status: (Other)         May 28, 2021     Patient: Vero SANDERS Lima   YOB: 2000   Date of Visit: 5/28/2021       To Whom It May Concern:    It is my medical opinion that Vero Lima cannot be cleared to return to work until clinic visit 6/7/2021.    If you have any questions or concerns, please don't hesitate to call.    Sincerely,        Electronically signed by Penelope SHETH CNM

## 2021-07-06 VITALS
DIASTOLIC BLOOD PRESSURE: 66 MMHG | WEIGHT: 186 LBS | HEIGHT: 66 IN | HEART RATE: 72 BPM | BODY MASS INDEX: 29.89 KG/M2 | SYSTOLIC BLOOD PRESSURE: 118 MMHG

## 2021-07-14 PROBLEM — O13.9 GESTATIONAL HYPERTENSION: Status: RESOLVED | Noted: 2021-04-09 | Resolved: 2021-06-07

## 2021-07-14 PROBLEM — Z34.90 PREGNANT: Status: RESOLVED | Noted: 2021-04-09 | Resolved: 2021-06-07

## 2021-07-14 PROBLEM — Z34.00 SUPERVISION OF NORMAL FIRST PREGNANCY, ANTEPARTUM: Status: RESOLVED | Noted: 2020-09-18 | Resolved: 2021-06-07

## 2021-07-14 PROBLEM — N94.89 UTERINE CRAMPING: Status: RESOLVED | Noted: 2021-03-22 | Resolved: 2021-06-07

## 2021-07-14 PROBLEM — M54.9 BACK PAIN AFFECTING PREGNANCY IN SECOND TRIMESTER: Status: RESOLVED | Noted: 2020-10-16 | Resolved: 2021-06-07

## 2021-07-14 PROBLEM — O99.891 BACK PAIN AFFECTING PREGNANCY IN SECOND TRIMESTER: Status: RESOLVED | Noted: 2020-10-16 | Resolved: 2021-06-07

## 2021-07-14 PROBLEM — Z37.9 NORMAL LABOR: Status: RESOLVED | Noted: 2021-04-09 | Resolved: 2021-06-07

## 2021-07-25 ENCOUNTER — HEALTH MAINTENANCE LETTER (OUTPATIENT)
Age: 21
End: 2021-07-25

## 2021-08-18 ENCOUNTER — MEDICAL CORRESPONDENCE (OUTPATIENT)
Dept: HEALTH INFORMATION MANAGEMENT | Facility: CLINIC | Age: 21
End: 2021-08-18

## 2021-09-19 ENCOUNTER — HEALTH MAINTENANCE LETTER (OUTPATIENT)
Age: 21
End: 2021-09-19

## 2021-11-21 ENCOUNTER — OFFICE VISIT (OUTPATIENT)
Dept: FAMILY MEDICINE | Facility: CLINIC | Age: 21
End: 2021-11-21
Payer: COMMERCIAL

## 2021-11-21 ENCOUNTER — HOSPITAL ENCOUNTER (EMERGENCY)
Facility: HOSPITAL | Age: 21
Discharge: LEFT BEFORE DISCHARGE | End: 2021-11-21
Payer: COMMERCIAL

## 2021-11-21 VITALS
TEMPERATURE: 97.5 F | SYSTOLIC BLOOD PRESSURE: 122 MMHG | BODY MASS INDEX: 28.61 KG/M2 | DIASTOLIC BLOOD PRESSURE: 73 MMHG | WEIGHT: 178 LBS | RESPIRATION RATE: 20 BRPM | HEART RATE: 81 BPM | HEIGHT: 66 IN | OXYGEN SATURATION: 100 %

## 2021-11-21 VITALS
DIASTOLIC BLOOD PRESSURE: 74 MMHG | RESPIRATION RATE: 16 BRPM | OXYGEN SATURATION: 100 % | HEART RATE: 80 BPM | SYSTOLIC BLOOD PRESSURE: 121 MMHG

## 2021-11-21 DIAGNOSIS — M25.461 EFFUSION OF RIGHT KNEE: Primary | ICD-10-CM

## 2021-11-21 PROCEDURE — 99214 OFFICE O/P EST MOD 30 MIN: CPT | Performed by: PHYSICIAN ASSISTANT

## 2021-11-21 ASSESSMENT — MIFFLIN-ST. JEOR: SCORE: 1584.15

## 2021-11-21 NOTE — ED TRIAGE NOTES
Pt fell a week ago and hurt right knee I was present with the resident during the history and exam.  I discussed the case with the resident and agree with the findings as documented in the assessment and plan. Due to tripping wearing high heels.  No MD consult and fell  Again yesterday and right knee gave out on her.  Pt  Noted swelling

## 2021-11-21 NOTE — PATIENT INSTRUCTIONS
You have a knee effusion and possible patellar dislocation that has relocated.  Use of the knee immobilizer when you are up and active.  He can be out of the knee immobilizer for sleep and for hygiene.  Crutch ambulation for protected weightbearing status when up and active.  Follow-up with orthopedics for definitive evaluation and treatment.      Patient Education     Fluid on the Knee    Fluid on the knee is also known as knee effusion. The knee joint normally has less than 1 ounce of fluid. Injury or inflammation of the knee joint causes extra fluid to collect there. When this happens, the knee joint looks swollen and is often painful. It may be hard to fully bend the knee.  The most common cause of fluid on the knee is osteoarthritis due to wear and tear on the joint cartilage. Other causes include injury to the cartilage, inflammatory arthritis such as gout or rheumatoid arthritis, and infection of the joint.  If the cause of the fluid is not certain, you may need a needle aspiration. This procedure removes a sample of joint fluid from the knee for testing. Removing excess fluid may also relieve swelling and pain.  Home care    Limit your activities. Stay off the injured leg as much as possible until pain improves.    Keep your leg elevated to reduce pain and swelling. When sleeping, place a pillow under the injured leg. When sitting, support the injured leg so it is above heart level. This is very important during the first 48 hours.    Apply an ice pack over the injured area for 15 to 20 minutes every 3 to 6 hours. You should do this for the first 24 to 48 hours. You can make an ice pack by filling a plastic bag that seals at the top with ice cubes and then wrapping it with a thin towel. Continue to use ice packs for relief of pain and swelling as needed. As the ice melts, be careful not to get your wrap, splint, or cast wet. After 48 hours, apply heat (warm shower or warm bath) for 15 to 20 minutes several  times a day, or alternate ice and heat. If you have to wear a hook-and-loop knee brace, you can open it to apply the ice pack, or heat, directly to the knee. Never put ice directly on the skin. Always wrap the ice in a towel or other type of cloth.    You may use over-the-counter pain medicine to control pain, unless another pain medicine was prescribed. If you have chronic liver or kidney disease or have ever had a stomach ulcer or gastrointestinal bleeding, talk with your healthcare provider before using these medicines.    If crutches or a walker have been recommended, don't put weight on the injured leg until you can do so without pain. Check with your healthcare provider before returning to sports or full work duties.    If you have a hook-and-loop knee brace, you can remove it to bathe and sleep, unless told otherwise.  Follow-up care  Follow up with your healthcare provider as advised.  If you are overweight, talk to your healthcare provider about a weight loss program. The excess weight puts extra strain on your knees.  When to seek medical advice  Call your healthcare provider right away if any of these occur:    Increasing pain, redness, or swelling of the knee    Fever of 100.4 F (38 C) or above lasting for 24 to 48 hours, or as advised    Shaking chills  [employer] last reviewed this educational content on 5/1/2018 2000-2021 [employer]. All rights reserved. This information is not intended as a substitute for professional medical care. Always follow your healthcare professional's instructions.

## 2021-11-21 NOTE — PROGRESS NOTES
"Patient presents with:  Knee Injury: RT KNEE--HEARD A \"POP\" IN KNEE FROM FALL LAST WEEK. FELL AGAIN YESTERDAY.       Clinical Decision Making:  Advised the patient that she does have a knee effusion.  I am not sure the cause of the knee effusions from her injury could be patellar dislocation, medial meniscal injury or medial collateral ligament sprain.  Patient is placed in knee immobilizer with protected weightbearing status.  Follow-up with orthopedics for definitive evaluation and treatment.  Questions were answered to her satisfaction before discharge.      ICD-10-CM    1. Effusion of right knee  M25.461 XR Knee Right 3 Views     Crutches Order for DME - ONLY FOR DME     Knee Supplies Order for DME - ONLY FOR DME     Orthopedic  Referral       Patient Instructions   You have a knee effusion and possible patellar dislocation that has relocated.  Use of the knee immobilizer when you are up and active.  He can be out of the knee immobilizer for sleep and for hygiene.  Crutch ambulation for protected weightbearing status when up and active.  Follow-up with orthopedics for definitive evaluation and treatment.      Patient Education     Fluid on the Knee    Fluid on the knee is also known as knee effusion. The knee joint normally has less than 1 ounce of fluid. Injury or inflammation of the knee joint causes extra fluid to collect there. When this happens, the knee joint looks swollen and is often painful. It may be hard to fully bend the knee.  The most common cause of fluid on the knee is osteoarthritis due to wear and tear on the joint cartilage. Other causes include injury to the cartilage, inflammatory arthritis such as gout or rheumatoid arthritis, and infection of the joint.  If the cause of the fluid is not certain, you may need a needle aspiration. This procedure removes a sample of joint fluid from the knee for testing. Removing excess fluid may also relieve swelling and pain.  Home care    Limit " your activities. Stay off the injured leg as much as possible until pain improves.    Keep your leg elevated to reduce pain and swelling. When sleeping, place a pillow under the injured leg. When sitting, support the injured leg so it is above heart level. This is very important during the first 48 hours.    Apply an ice pack over the injured area for 15 to 20 minutes every 3 to 6 hours. You should do this for the first 24 to 48 hours. You can make an ice pack by filling a plastic bag that seals at the top with ice cubes and then wrapping it with a thin towel. Continue to use ice packs for relief of pain and swelling as needed. As the ice melts, be careful not to get your wrap, splint, or cast wet. After 48 hours, apply heat (warm shower or warm bath) for 15 to 20 minutes several times a day, or alternate ice and heat. If you have to wear a hook-and-loop knee brace, you can open it to apply the ice pack, or heat, directly to the knee. Never put ice directly on the skin. Always wrap the ice in a towel or other type of cloth.    You may use over-the-counter pain medicine to control pain, unless another pain medicine was prescribed. If you have chronic liver or kidney disease or have ever had a stomach ulcer or gastrointestinal bleeding, talk with your healthcare provider before using these medicines.    If crutches or a walker have been recommended, don't put weight on the injured leg until you can do so without pain. Check with your healthcare provider before returning to sports or full work duties.    If you have a hook-and-loop knee brace, you can remove it to bathe and sleep, unless told otherwise.  Follow-up care  Follow up with your healthcare provider as advised.  If you are overweight, talk to your healthcare provider about a weight loss program. The excess weight puts extra strain on your knees.  When to seek medical advice  Call your healthcare provider right away if any of these occur:    Increasing pain,  redness, or swelling of the knee    Fever of 100.4 F (38 C) or above lasting for 24 to 48 hours, or as advised    Shaking chills  [employer] last reviewed this educational content on 2018-2021 [employer]. All rights reserved. This information is not intended as a substitute for professional medical care. Always follow your healthcare professional's instructions.               HPI:  Vero Lima is a 21 year old female who presents today for right knee pain.  Patient states that she fell last week and had pain on the right knee.  She was getting up to go to the toilet and she fell again and hit the right knee.  She has had difficulty with weightbearing and pain and swelling on the right knee.  She feels that her patella may have moved out of place and was able to be pushed back in place by herself.  Subsequent to that injury, she had a second fall and has had pain and swelling in the right knee and inability to bear full weight on it without pain.  She has also had swelling..     History obtained from chart review and the patient.    Problem List:  2021: Encounter for general counseling and advice on contraceptive   management  2021: Routine postpartum follow-up  2021: Pregnant  2021: Normal labor  2021: Gestational hypertension  2021: Uterine cramping  2020-10: Back pain affecting pregnancy in second trimester  2020: Supervision of normal first pregnancy, antepartum   (normal spontaneous vaginal delivery)      Past Medical History:   Diagnosis Date     Depression      Gestational hypertension 2021    Developed during labor only.  [facility] labs within normal limits, no proteinuria noted. No other signs and symptoms of preeclampsia noted other than pretibial edema.     Migraines        Social History     Tobacco Use     Smoking status: Former Smoker     Smokeless tobacco: Never Used   Substance Use Topics     Alcohol use: Yes     Comment: Alcoholic  Drinks/day: 1 per month       Review of Systems  As above in HPI otherwise negative.    Vitals:    11/21/21 1330   BP: 121/74   BP Location: Right arm   Patient Position: Sitting   Cuff Size: Adult Regular   Pulse: 80   Resp: 16   SpO2: 100%     General: Patient is resting comfortably no acute distress is afebrile  HEENT: Head is normocephalic atraumatic   Skin: Without rash non-diaphoretic  Musculoskeletal:  Examination of the right lower extremity shows that the hip and ankle are nontender palpation full range of motion without effusion.  The right knee is with effusion.  Patella is ballotable.  There is negative apprehension sign.   Patient has negative Lachman negative anterior drawer sign.  Valgus stressing of the medial collateral ligament has a good endpoint but is painful for the patient at 0 and 30 degrees of valgus stressing.      Physical Exam    Labs:  Results for orders placed or performed in visit on 11/21/21   XR Knee Right 3 Views     Status: None    Narrative    EXAM DATE:         11/21/2021    EXAM: X-RAY KNEE, RIGHT, 3 VIEWS  LOCATION: [facility]  DATE/TIME: 11/21/2021 2:00 PM    INDICATION: Right knee effusion with pain.  COMPARISON: None.    IMPRESSION: Normal joint spaces and alignment. No fracture or joint effusion.                 At the end of the encounter, I discussed results, diagnosis, medications. Discussed red flags for immediate return to clinic/ER, as well as indications for follow up if no improvement. Patient understood and agreed to plan. Patient was stable for discharge.

## 2021-11-23 ENCOUNTER — TRANSFERRED RECORDS (OUTPATIENT)
Dept: HEALTH INFORMATION MANAGEMENT | Facility: CLINIC | Age: 21
End: 2021-11-23
Payer: COMMERCIAL

## 2021-12-02 ENCOUNTER — TRANSFERRED RECORDS (OUTPATIENT)
Dept: HEALTH INFORMATION MANAGEMENT | Facility: CLINIC | Age: 21
End: 2021-12-02
Payer: COMMERCIAL

## 2021-12-30 ENCOUNTER — HOSPITAL ENCOUNTER (EMERGENCY)
Facility: HOSPITAL | Age: 21
Discharge: HOME | End: 2021-12-30
Payer: COMMERCIAL

## 2022-01-18 VITALS
BODY MASS INDEX: 34.22 KG/M2 | WEIGHT: 212 LBS | OXYGEN SATURATION: 98 % | HEART RATE: 100 BPM | SYSTOLIC BLOOD PRESSURE: 128 MMHG | DIASTOLIC BLOOD PRESSURE: 70 MMHG

## 2022-01-18 VITALS
WEIGHT: 205 LBS | SYSTOLIC BLOOD PRESSURE: 120 MMHG | HEART RATE: 68 BPM | BODY MASS INDEX: 33.75 KG/M2 | DIASTOLIC BLOOD PRESSURE: 74 MMHG | SYSTOLIC BLOOD PRESSURE: 126 MMHG | HEIGHT: 66 IN | WEIGHT: 210 LBS | BODY MASS INDEX: 33.09 KG/M2 | DIASTOLIC BLOOD PRESSURE: 64 MMHG

## 2022-01-18 VITALS
WEIGHT: 216 LBS | HEIGHT: 66 IN | OXYGEN SATURATION: 99 % | DIASTOLIC BLOOD PRESSURE: 74 MMHG | SYSTOLIC BLOOD PRESSURE: 122 MMHG | HEART RATE: 89 BPM | BODY MASS INDEX: 34.72 KG/M2

## 2022-01-18 VITALS
SYSTOLIC BLOOD PRESSURE: 102 MMHG | WEIGHT: 190 LBS | HEIGHT: 66 IN | DIASTOLIC BLOOD PRESSURE: 68 MMHG | BODY MASS INDEX: 30.53 KG/M2 | HEART RATE: 84 BPM

## 2022-01-18 VITALS
HEART RATE: 76 BPM | HEIGHT: 66 IN | WEIGHT: 180 LBS | SYSTOLIC BLOOD PRESSURE: 120 MMHG | BODY MASS INDEX: 28.93 KG/M2 | DIASTOLIC BLOOD PRESSURE: 64 MMHG

## 2022-01-18 VITALS
TEMPERATURE: 98.2 F | SYSTOLIC BLOOD PRESSURE: 122 MMHG | BODY MASS INDEX: 29.49 KG/M2 | DIASTOLIC BLOOD PRESSURE: 62 MMHG | HEART RATE: 70 BPM | WEIGHT: 182.7 LBS

## 2022-01-18 VITALS
SYSTOLIC BLOOD PRESSURE: 124 MMHG | WEIGHT: 202 LBS | BODY MASS INDEX: 32.47 KG/M2 | HEART RATE: 72 BPM | HEIGHT: 66 IN | DIASTOLIC BLOOD PRESSURE: 68 MMHG

## 2022-01-18 VITALS
OXYGEN SATURATION: 94 % | SYSTOLIC BLOOD PRESSURE: 128 MMHG | DIASTOLIC BLOOD PRESSURE: 64 MMHG | WEIGHT: 196 LBS | HEART RATE: 91 BPM | BODY MASS INDEX: 31.64 KG/M2

## 2022-01-25 PROBLEM — Z30.09 ENCOUNTER FOR GENERAL COUNSELING AND ADVICE ON CONTRACEPTIVE MANAGEMENT: Status: RESOLVED | Noted: 2021-06-07 | Resolved: 2022-01-25

## 2022-01-26 NOTE — PROGRESS NOTES
Assessment/Plan:    Screen for STD (sexually transmitted disease)  Patient requests STD screening today, she is currently asymptomatic but her partner was having penile discharge recently (his testing ended up being negative).  Will check labs as below.  - Hepatitis C antibody  - HIV Antigen Antibody Combo  - Hepatitis B surface antigen  - Treponema Abs w Reflex to RPR and Titer  - Neisseria gonorrhoeae PCR  - Chlamydia trachomatis PCR  - Wet prep - Clinic Collect    Cervical cancer screening  Due for Pap smear, completed today.  - Pap screen reflex to HPV if ASCUS - recommend age 25 - 29    Yeast infection of the vagina  Wet prep returned positive for yeast infection, will treat with Diflucan as below.  - fluconazole (DIFLUCAN) 150 MG tablet  Dispense: 2 tablet; Refill: 0         Follow up: 1 year for physical, sooner as needed    Brandi Woodson MD  [facility]    Subjective:   Vero Lima is a 21 year old female is here today for STD testing    -partner tested negative - was experiencing some penile discharge so he was treated empirically  -hx 3 years chlamydia - treated at that time  -not currently having any symptoms but does have discharge intermittently  -due for pap smear  -wants STD testing today      Past Medical History:   Diagnosis Date     Depression      Gestational hypertension 4/9/2021    Developed during labor only.  [facility] labs within normal limits, no proteinuria noted. No other signs and symptoms of preeclampsia noted other than pretibial edema.     Migraines      Past Surgical History:   Procedure Laterality Date     NO HISTORY OF SURGERY       Current Outpatient Medications   Medication     fluconazole (DIFLUCAN) 150 MG tablet     No current facility-administered medications for this visit.     Allergies   Allergen Reactions     Tomato Hives     Social History     Socioeconomic History     Marital status: Single     Spouse name: Not on file     Number of children: Not on  file     Years of education: 12     Highest education level: Not on file   Occupational History     Not on file   Tobacco Use     Smoking status: Former Smoker     Smokeless tobacco: Never Used   Substance and Sexual Activity     Alcohol use: Yes     Comment: Alcoholic Drinks/day: 1 per month     Drug use: Not Currently     Types: Marijuana     Sexual activity: Yes     Partners: Male   Other Topics Concern     Not on file   Social History Narrative     Not on file     Social Determinants of Health     Financial Resource Strain: Not on file   Food Insecurity: Not on file   Transportation Needs: Not on file   Physical Activity: Not on file   Stress: Not on file   Social Connections: Not on file   Intimate Partner Violence: Not on file   Housing Stability: Not on file     Family History   Problem Relation Age of Onset     Diabetes Type 2  Mother      Hypertension No family hx of      Cancer No family hx of      Coronary Artery Disease No family hx of      Hyperlipidemia No family hx of      Depression Mother      Diabetes Mother      Hypertension Mother      Review of systems is as stated in HPI, and the remainder of system review is otherwise negative.    Objective:     /72 (BP Location: Left arm, Patient Position: Sitting, Cuff Size: Adult Regular)   Wt 79.6 kg (175 lb 6.4 oz)   BMI 28.31 kg/m      General appearance: awake, NAD  HEENT: atraumatic, normocephalic, no scleral icterus or injection  Lungs: breathing comfortably on room air  : Normal external genitalia, normal-appearing cervix, moderate white/yellow discharge  Extremities: moving all extremities  Neuro: alert, oriented x3, CNs grossly intact, no focal deficits appreciated  Psych: normal mood/affect/behavior, answering questions appropriately, linear thought process

## 2022-01-26 NOTE — PROGRESS NOTES
"  Subjective:   Vero Lima is a 20 year old year old female who presents to clinic today for the following health issues:    Vaginal Bleeding   Was seen at the [facility] ED on  for vaginal bleeding at approximately 3 weeks gestation based on LMP. HCG was 504. O positive. No imaging obtained. Told that it was a threatened miscarriage and recommended to follow up within 3 days for repeat labs.     Still having bleeding, but it is very light. Really only notices faint red/pink on the tissue when she wipes. At the heaviest bleeding she wasn't even going through 1 full tampon or pad. She denies pain, lightheadedness or dizziness.    She is a . She had an elective termination in 2017. She denies personal or family history of bleeding or clotting disorders. She has never had a pap smear as she just turned 20. No current medications.      She works as a home health aide. She denies tobacco, alcohol, or illicit drug use. Notes that she did smoke marijuana occasionally but stopped when she found out she was pregnant.    PMHX:     There is no problem list on file for this patient.    Allergies   Allergen Reactions     Tomato Hives       Review of Systems:     Constitutional, HEENT, cardiovascular, pulmonary, GI, musculoskeletal, neuro, skin, and psych systems are negative, except as otherwise noted.     Objective:     /83   Pulse 76   Temp 98.4  F (36.9  C) (Oral)   Resp 18   Ht 1.7 m (5' 6.93\")   Wt 76.6 kg (168 lb 12.8 oz)   LMP 2020   SpO2 100%   BMI 26.49 kg/m    Body mass index is 26.49 kg/m .  GENERAL APPEARANCE: healthy, alert and no distress,  EYES: Eyes grossly normal to inspection,  PERRL  RESP: lungs clear to auscultation - no rales, rhonchi or wheezes  CV: regular rate and rhythm,  and no murmur, click,  rub or gallop  ABDOMEN: soft, nontender, without hepatosplenomegaly or masses  MS: extremities normal- no gross deformities noted  SKIN: no suspicious lesions or rashes  NEURO: " Patient in stable condition at time of discharge to home. No s/sx of apparent distress noted. Patient received discharge instructions including any prescriptions from Provider. "Normal strength and tone, sensory exam grossly normal, mentation appears intact and speech normal  PSYCH: mood and affect normal/bright    Assessment & Plan:     1. Vaginal bleeding in pregnancy, first trimester  Discussed possible causes of vaginal bleeding in first trimester including miscarriage, subchorionic hemorrhage, and placenta previa. Planned to recheck patient's serum beta-hCG quant here today - discussed that results would be available later this afternoon/evening and I would call her. She was a little frustrated and stated that she would just go to the ED in order to be told the results immediately as she is \"tired of waiting.\" We discussed that going to the ED was not an appropriate use of resources. Unfortunately, patient left the clinic prior to lab being drawn. We tried to call her to have her return for a blood draw without answer. Order for beta-hCG quant placed as a future order should patient decide to return.  - Beta-HCG Quantitative ([employer]); Future    Options for treatment and follow-up care were reviewed with the patient and/or guardian. Vero Lima and/or guardian engaged in the decision making process and verbalized understanding of the options discussed and agreed with the final plan.    Makenzie García MD  [facility] Medicine Resident    Precepted with: Eliza Faulkner MD            "

## 2022-01-27 ENCOUNTER — MYC MEDICAL ADVICE (OUTPATIENT)
Dept: FAMILY MEDICINE | Facility: CLINIC | Age: 22
End: 2022-01-27

## 2022-01-27 ENCOUNTER — OFFICE VISIT (OUTPATIENT)
Dept: FAMILY MEDICINE | Facility: CLINIC | Age: 22
End: 2022-01-27
Payer: COMMERCIAL

## 2022-01-27 VITALS — BODY MASS INDEX: 28.31 KG/M2 | DIASTOLIC BLOOD PRESSURE: 72 MMHG | SYSTOLIC BLOOD PRESSURE: 118 MMHG | WEIGHT: 175.4 LBS

## 2022-01-27 DIAGNOSIS — Z12.4 CERVICAL CANCER SCREENING: ICD-10-CM

## 2022-01-27 DIAGNOSIS — Z11.3 SCREEN FOR STD (SEXUALLY TRANSMITTED DISEASE): Primary | ICD-10-CM

## 2022-01-27 DIAGNOSIS — B37.31 YEAST INFECTION OF THE VAGINA: Primary | ICD-10-CM

## 2022-01-27 DIAGNOSIS — B37.31 YEAST INFECTION OF THE VAGINA: ICD-10-CM

## 2022-01-27 LAB
CLUE CELLS: ABNORMAL
HIV 1+2 AB+HIV1 P24 AG SERPL QL IA: NEGATIVE
HOLD SPECIMEN: NORMAL
TRICHOMONAS, WET PREP: ABNORMAL
WBC'S/HIGH POWER FIELD, WET PREP: ABNORMAL
YEAST, WET PREP: PRESENT

## 2022-01-27 PROCEDURE — 86780 TREPONEMA PALLIDUM: CPT | Performed by: FAMILY MEDICINE

## 2022-01-27 PROCEDURE — 87210 SMEAR WET MOUNT SALINE/INK: CPT | Performed by: FAMILY MEDICINE

## 2022-01-27 PROCEDURE — 87389 HIV-1 AG W/HIV-1&-2 AB AG IA: CPT | Performed by: FAMILY MEDICINE

## 2022-01-27 PROCEDURE — 36415 COLL VENOUS BLD VENIPUNCTURE: CPT | Performed by: FAMILY MEDICINE

## 2022-01-27 PROCEDURE — G0123 SCREEN CERV/VAG THIN LAYER: HCPCS | Performed by: FAMILY MEDICINE

## 2022-01-27 PROCEDURE — 87591 N.GONORRHOEAE DNA AMP PROB: CPT | Performed by: FAMILY MEDICINE

## 2022-01-27 PROCEDURE — 99214 OFFICE O/P EST MOD 30 MIN: CPT | Performed by: FAMILY MEDICINE

## 2022-01-27 PROCEDURE — 87491 CHLMYD TRACH DNA AMP PROBE: CPT | Performed by: FAMILY MEDICINE

## 2022-01-27 PROCEDURE — 86803 HEPATITIS C AB TEST: CPT | Performed by: FAMILY MEDICINE

## 2022-01-27 PROCEDURE — 87340 HEPATITIS B SURFACE AG IA: CPT | Performed by: FAMILY MEDICINE

## 2022-01-27 RX ORDER — FLUCONAZOLE 150 MG/1
150 TABLET ORAL ONCE
Qty: 1 TABLET | Refills: 0 | Status: SHIPPED | OUTPATIENT
Start: 2022-01-27 | End: 2022-01-27

## 2022-01-27 RX ORDER — FLUCONAZOLE 150 MG/1
TABLET ORAL
Qty: 2 TABLET | Refills: 0 | Status: SHIPPED | OUTPATIENT
Start: 2022-01-27 | End: 2022-04-28

## 2022-01-28 LAB
BKR LAB AP GYN ADEQUACY: NORMAL
BKR LAB AP GYN INTERPRETATION: NORMAL
BKR LAB AP HPV REFLEX: NORMAL
BKR LAB AP PREVIOUS ABNORMAL: NORMAL
C TRACH DNA SPEC QL NAA+PROBE: NEGATIVE
HBV SURFACE AG SERPL QL IA: NONREACTIVE
HCV AB SERPL QL IA: NEGATIVE
N GONORRHOEA DNA SPEC QL NAA+PROBE: NEGATIVE
PATH REPORT.COMMENTS IMP SPEC: NORMAL
PATH REPORT.COMMENTS IMP SPEC: NORMAL
PATH REPORT.RELEVANT HX SPEC: NORMAL
T PALLIDUM AB SER QL: NONREACTIVE

## 2022-04-28 ENCOUNTER — OFFICE VISIT (OUTPATIENT)
Dept: FAMILY MEDICINE | Facility: CLINIC | Age: 22
End: 2022-04-28
Payer: COMMERCIAL

## 2022-04-28 VITALS
HEART RATE: 65 BPM | OXYGEN SATURATION: 98 % | HEIGHT: 66 IN | SYSTOLIC BLOOD PRESSURE: 102 MMHG | TEMPERATURE: 98.4 F | RESPIRATION RATE: 18 BRPM | BODY MASS INDEX: 28.12 KG/M2 | WEIGHT: 175 LBS | DIASTOLIC BLOOD PRESSURE: 68 MMHG

## 2022-04-28 DIAGNOSIS — Z11.3 SCREEN FOR STD (SEXUALLY TRANSMITTED DISEASE): Primary | ICD-10-CM

## 2022-04-28 DIAGNOSIS — B00.9 HSV (HERPES SIMPLEX VIRUS) INFECTION: ICD-10-CM

## 2022-04-28 LAB
CLUE CELLS: ABNORMAL
HCV AB SERPL QL IA: NEGATIVE
HIV 1+2 AB+HIV1 P24 AG SERPL QL IA: NEGATIVE
TRICHOMONAS, WET PREP: ABNORMAL
WBC'S/HIGH POWER FIELD, WET PREP: ABNORMAL
YEAST, WET PREP: ABNORMAL

## 2022-04-28 PROCEDURE — 87210 SMEAR WET MOUNT SALINE/INK: CPT | Performed by: FAMILY MEDICINE

## 2022-04-28 PROCEDURE — 99214 OFFICE O/P EST MOD 30 MIN: CPT | Performed by: FAMILY MEDICINE

## 2022-04-28 PROCEDURE — 86696 HERPES SIMPLEX TYPE 2 TEST: CPT | Performed by: FAMILY MEDICINE

## 2022-04-28 PROCEDURE — 86803 HEPATITIS C AB TEST: CPT | Performed by: FAMILY MEDICINE

## 2022-04-28 PROCEDURE — 86695 HERPES SIMPLEX TYPE 1 TEST: CPT | Performed by: FAMILY MEDICINE

## 2022-04-28 PROCEDURE — 36415 COLL VENOUS BLD VENIPUNCTURE: CPT | Performed by: FAMILY MEDICINE

## 2022-04-28 PROCEDURE — 87389 HIV-1 AG W/HIV-1&-2 AB AG IA: CPT | Performed by: FAMILY MEDICINE

## 2022-04-28 ASSESSMENT — PAIN SCALES - GENERAL: PAINLEVEL: NO PAIN (0)

## 2022-04-28 NOTE — PROGRESS NOTES
"    Assessment & Plan     (Z11.3) Screen for STD (sexually transmitted disease)  (primary encounter diagnosis)  Comment:   Plan: Wet preparation, Hepatitis C antibody, HIV         Antigen Antibody Combo, Herpes Simplex Virus 1         and 2 IgG, Chlamydia trachomatis/Neisseria         gonorrhoeae by PCR, CANCELED: Chlamydia         trachomatis/Neisseria gonorrhoeae by PCR            (B00.9) HSV-1 infection   Comment: tx options have been reviewed and patients opts in for tx outbreaks prn   Plan: see epic                    No follow-ups on file.    Adwoa Castellanos MD  [facility] SALINA Pham is a 21 year old who presents for STD checks today.   No prior Stds, and is asymptomatic.     Patient would like to start taking vitamin supplements and is wondering what to take.     STD    History of Present Illness       Reason for visit:  Check up  Symptom onset:  Today  Symptom intensity:  Mild  Symptom progression:  Staying the same  Had these symptoms before:  No  What makes it worse:  No  What makes it better:  No    She eats 2-3 servings of fruits and vegetables daily.She consumes 3 sweetened beverage(s) daily.She exercises with enough effort to increase her heart rate 9 or less minutes per day.  She exercises with enough effort to increase her heart rate 3 or less days per week.   She is taking medications regularly.        Review of Systems         Objective    /68   Pulse 65   Temp 98.4  F (36.9  C)   Resp 18   Ht 1.676 m (5' 6\")   Wt 79.4 kg (175 lb)   SpO2 98%   Breastfeeding No   BMI 28.25 kg/m    Body mass index is 28.25 kg/m .       Physical Exam   GENERAL: healthy, alert and no distress   (female): normal female external genitalia, normal urethral meatus, vaginal mucosa, normal cervix/adnexa/uterus without masses or discharge  MS: no gross musculoskeletal defects noted, no edema                "

## 2022-04-29 LAB
HSV1 IGG SERPL QL IA: 49.2 INDEX
HSV1 IGG SERPL QL IA: ABNORMAL
HSV2 IGG SERPL QL IA: 0.06 INDEX
HSV2 IGG SERPL QL IA: ABNORMAL

## 2022-05-11 PROBLEM — B00.9 HSV (HERPES SIMPLEX VIRUS) INFECTION: Status: ACTIVE | Noted: 2022-05-11

## 2022-05-11 RX ORDER — VALACYCLOVIR HYDROCHLORIDE 1 G/1
2000 TABLET, FILM COATED ORAL 2 TIMES DAILY
Qty: 12 TABLET | Refills: 0 | Status: SHIPPED | OUTPATIENT
Start: 2022-05-11 | End: 2022-05-20

## 2022-05-19 ENCOUNTER — MYC MEDICAL ADVICE (OUTPATIENT)
Dept: FAMILY MEDICINE | Facility: CLINIC | Age: 22
End: 2022-05-19
Payer: COMMERCIAL

## 2022-05-19 DIAGNOSIS — B00.9 HSV (HERPES SIMPLEX VIRUS) INFECTION: ICD-10-CM

## 2022-05-20 RX ORDER — VALACYCLOVIR HYDROCHLORIDE 1 G/1
2000 TABLET, FILM COATED ORAL 2 TIMES DAILY
Qty: 32 TABLET | Refills: 0 | Status: SHIPPED | OUTPATIENT
Start: 2022-05-20 | End: 2022-07-11

## 2022-05-31 ENCOUNTER — MYC REFILL (OUTPATIENT)
Dept: FAMILY MEDICINE | Facility: CLINIC | Age: 22
End: 2022-05-31
Payer: COMMERCIAL

## 2022-05-31 DIAGNOSIS — B00.9 HSV (HERPES SIMPLEX VIRUS) INFECTION: ICD-10-CM

## 2022-06-01 RX ORDER — VALACYCLOVIR HYDROCHLORIDE 1 G/1
2000 TABLET, FILM COATED ORAL 2 TIMES DAILY
Qty: 32 TABLET | Refills: 0 | OUTPATIENT
Start: 2022-06-01

## 2022-07-11 ENCOUNTER — MYC REFILL (OUTPATIENT)
Dept: FAMILY MEDICINE | Facility: CLINIC | Age: 22
End: 2022-07-11

## 2022-07-11 DIAGNOSIS — B00.9 HSV (HERPES SIMPLEX VIRUS) INFECTION: ICD-10-CM

## 2022-07-12 RX ORDER — VALACYCLOVIR HYDROCHLORIDE 1 G/1
2000 TABLET, FILM COATED ORAL 2 TIMES DAILY
Qty: 32 TABLET | Refills: 0 | Status: SHIPPED | OUTPATIENT
Start: 2022-07-12 | End: 2022-08-07

## 2022-07-12 NOTE — TELEPHONE ENCOUNTER
"              After Visit Summary   10/16/2018    Kelvin Sellers    MRN: [id number]           Patient Information     Date Of Birth          2000        Visit Information        Provider Department      10/16/2018 10:20 AM Yulisa Ulloa APRN CNP [facility]        Today's Diagnoses     Throat pain    -  1    OME (otitis media with effusion), left          Care Instructions      For ear pain may take tylenol or ibuprofen.   Drink plenty of fluids and rest.  May use salt water gargles- about 8 oz warm water with about 1 teaspoon salt  Sucrets and Cepacol spray are over the counter medications that numb the throat.  Over the counter pain relievers such as tylenol or ibuprofen may be used as needed.   Honey lemon tea helps to soothe the throat. \"Throat Coat\" tea is soothing as well.    If symptoms do not start to clear in 3 days return to clinic for further evaluation.     Thank you  Yulisa Ulloa CNP                  Follow-ups after your visit        Who to contact     If you have questions or need follow up information about today's clinic visit or your schedule please contact [facility] directly at [phone].  Normal or non-critical lab and imaging results will be communicated to you by MyChart, letter or phone within 4 business days after the clinic has received the results. If you do not hear from us within 7 days, please contact the clinic through [email or web address] or phone. If you have a critical or abnormal lab result, we will notify you by phone as soon as possible.  Submit refill requests through [employer] or call your pharmacy and they will forward the refill request to us. Please allow 3 business days for your refill to be completed.          Additional Information About Your Visit        MyChart Information     Fl[employer] lets you send messages to your doctor, view your test results, renew your prescriptions, schedule appointments and more. To sign up, go to " "Routing refill request to provider for review/approval because:  Labs not current:  creatinine    Last Written Prescription Date:  5/20/2022  Last Fill Quantity: 32,  # refills: 0   Last office visit provider:  4/28/2022     Requested Prescriptions   Pending Prescriptions Disp Refills     valACYclovir (VALTREX) 1000 mg tablet 32 tablet 0     Sig: Take 2 tablets (2,000 mg) by mouth 2 times daily       Antivirals for Herpes Protocol Failed - 7/11/2022 10:45 AM        Failed - Normal serum creatinine on file in past 12 months     Recent Labs   Lab Test 04/09/21  1540   CR 0.68       Ok to refill medication if creatinine is low          Passed - Patient is age 12 or older        Passed - Recent (12 mo) or future (30 days) visit within the authorizing provider's specialty     Patient has had an office visit with the authorizing provider or a provider within the authorizing providers department within the previous 12 mos or has a future within next 30 days. See \"Patient Info\" tab in inbasket, or \"Choose Columns\" in Meds & Orders section of the refill encounter.              Passed - Medication is active on med list             Cristina Padilla RN 07/11/22 10:36 PM  " "[email or web address] . Click on \"Log in\" on the left side of the screen, which will take you to the Welcome page. Then click on \"Sign up Now\" on the right side of the page.     You will be asked to enter the access code listed below, as well as some personal information. Please follow the directions to create your username and password.     Your access code is: [email or web address]  Expires: 2019 11:04 AM     Your access code will  in 90 days. If you need help or a new code, please call your [place] clinic or [phone].        Care EveryWhere ID     This is your Care EveryWhere ID. This could be used by other organizations to access your [place] medical records  [id number]        Your Vitals Were     Pulse Temperature Respirations Height BMI (Body Mass Index)       84 99.5  F (37.5  C) (Temporal) 18 5' 7.72\" (1.72 m) 21.31 kg/m2        Blood Pressure from Last 3 Encounters:   10/16/18 118/70   18 113/60   02/24/15 106/73    Weight from Last 3 Encounters:   10/16/18 139 lb (63 kg) (25 %)*   16 135 lb 9.6 oz (61.5 kg) (36 %)*   14 124 lb (56.2 kg) (40 %)*     * Growth percentiles are based on [facility] 2-20 Years data.              We Performed the Following     Beta strep group A culture     Strep, Rapid Screen          Today's Medication Changes          These changes are accurate as of 10/16/18 11:05 AM.  If you have any questions, ask your nurse or doctor.               Start taking these medicines.        Dose/Directions    amoxicillin-clavulanate 875-125 MG per tablet   Commonly known as:  AUGMENTIN   Used for:  OME (otitis media with effusion), left   Started by:  Yulisa Ulloa APRN CNP        Dose:  1 tablet   Take 1 tablet by mouth 2 times daily for 7 days   Quantity:  14 tablet   Refills:  0            Where to get your medicines      These medications were sent to [facility] Moreno - [address]  [address] [place] [address] [address] "     Phone:  [phone]     amoxicillin-clavulanate 875-125 MG per tablet                Primary Care Provider Office Phone # Fax #    Vicky Monroe [phone]       [address] [place] [address]        Equal Access to Services     [facility] BIGG : Hadii krista ku hadasho Soomaali, waaxda luqadaha, qaybta kaalmada adeegyada, waxay jovi haychristinen lucero sesayyoniteresa kaminski . So [facility] [phone].    ATENCIÓN: Si habla español, tiene a santos disposición servicios gratuitos de asistencia lingüística. [facility] [phone].    We comply with applicable federal civil rights laws and [place] laws. We do not discriminate on the basis of race, color, national origin, age, disability, sex, sexual orientation, or gender identity.            Thank you!     Thank you for choosing [facility]  for your care. Our goal is always to provide you with excellent care. Hearing back from our patients is one way we can continue to improve our services. Please take a few minutes to complete the written survey that you may receive in the mail after your visit with us. Thank you!             Your Updated Medication List - Protect others around you: Learn how to safely use, store and throw away your medicines at www.disposemymeds.org.          This list is accurate as of 10/16/18 11:05 AM.  Always use your most recent med list.                   Brand Name Dispense Instructions for use Diagnosis    amoxicillin-clavulanate 875-125 MG per tablet    AUGMENTIN    14 tablet    Take 1 tablet by mouth 2 times daily for 7 days    OME (otitis media with effusion), left       ibuprofen 200 MG tablet    ADVIL/MOTRIN     Take 200 mg by mouth every 8 hours as needed.        neomycin-polymyxin-hydrocortisone 3.5-93310-3 otic suspension    CORTISPORIN    10 mL    Place 3 drops into the right ear 3 times daily For 5 days    Acute diffuse otitis externa of right ear       TYLENOL 325 MG tablet   Generic drug:   acetaminophen      Take 325-650 mg by mouth every 6 hours as needed for mild pain

## 2022-08-07 ENCOUNTER — MYC REFILL (OUTPATIENT)
Dept: FAMILY MEDICINE | Facility: CLINIC | Age: 22
End: 2022-08-07

## 2022-08-07 DIAGNOSIS — B00.9 HSV (HERPES SIMPLEX VIRUS) INFECTION: ICD-10-CM

## 2022-08-08 RX ORDER — VALACYCLOVIR HYDROCHLORIDE 1 G/1
2000 TABLET, FILM COATED ORAL 2 TIMES DAILY
Qty: 32 TABLET | Refills: 0 | Status: SHIPPED | OUTPATIENT
Start: 2022-08-08 | End: 2022-09-03

## 2022-08-08 NOTE — TELEPHONE ENCOUNTER
"Routing refill request to provider for review/approval because:  Labs not current:  Creatinine     Last Written Prescription Date:  7/12/2022  Last Fill Quantity: 32,  # refills: 0   Last office visit provider:  4/28/2022     Requested Prescriptions   Pending Prescriptions Disp Refills     valACYclovir (VALTREX) 1000 mg tablet 32 tablet 0     Sig: Take 2 tablets (2,000 mg) by mouth 2 times daily       Antivirals for Herpes Protocol Failed - 8/7/2022  8:26 PM        Failed - Normal serum creatinine on file in past 12 months     Recent Labs   Lab Test 04/09/21  1540   CR 0.68       Ok to refill medication if creatinine is low          Passed - Patient is age 12 or older        Passed - Recent (12 mo) or future (30 days) visit within the authorizing provider's specialty     Patient has had an office visit with the authorizing provider or a provider within the authorizing providers department within the previous 12 mos or has a future within next 30 days. See \"Patient Info\" tab in inbasket, or \"Choose Columns\" in Meds & Orders section of the refill encounter.              Passed - Medication is active on med list             Cristina Padilla RN 08/07/22 10:34 PM  "

## 2022-08-15 NOTE — LETTER
[facility]  [address]  [facility] [address]  Phone: [phone]  Fax: [phone]    November 21, 2021        Vero SANDERS Lima  [address]   [place] [address]          To whom it may concern:    RE: Vero SANDERS Lima    Patient was seen and treated today at our clinic and missed work.  Patient was seen in our clinic on 11/21/21 and may be excused from work on 11/22/21.  Patient will be evaluated by orthopedics on the week of 11/22/2021.  She will use crutch ambulation knee immobilizer until seen by orthopedics.    Please contact me for questions or concerns.      Sincerely,        Lee Noble PA-C  
[facility] [address]  [facility] [address]  [phone]      November 21, 2021    RE:  Vero Lima                                                                                                                                                       [address]   [place] [address]            To whom it may concern:    Vero Lima is under my professional care for Effusion of right knee.   She  may return to work with the following: Crutch ambulation with nonweightbearing on the right lower extremity and use of knee immobilizer.  Patient will have use of knee immobilizer crutches until seen by orthopedics.      Sincerely,        ARTI Morrissey [facility].        
General

## 2022-08-21 ENCOUNTER — HEALTH MAINTENANCE LETTER (OUTPATIENT)
Age: 22
End: 2022-08-21

## 2022-09-03 ENCOUNTER — MYC REFILL (OUTPATIENT)
Dept: FAMILY MEDICINE | Facility: CLINIC | Age: 22
End: 2022-09-03

## 2022-09-03 DIAGNOSIS — B00.9 HSV (HERPES SIMPLEX VIRUS) INFECTION: ICD-10-CM

## 2022-09-06 RX ORDER — VALACYCLOVIR HYDROCHLORIDE 1 G/1
2000 TABLET, FILM COATED ORAL 2 TIMES DAILY
Qty: 32 TABLET | Refills: 0 | Status: SHIPPED | OUTPATIENT
Start: 2022-09-06 | End: 2022-09-23

## 2022-09-23 ENCOUNTER — MYC REFILL (OUTPATIENT)
Dept: FAMILY MEDICINE | Facility: CLINIC | Age: 22
End: 2022-09-23

## 2022-09-23 DIAGNOSIS — B00.9 HSV (HERPES SIMPLEX VIRUS) INFECTION: ICD-10-CM

## 2022-09-23 RX ORDER — VALACYCLOVIR HYDROCHLORIDE 1 G/1
2000 TABLET, FILM COATED ORAL 2 TIMES DAILY
Qty: 32 TABLET | Refills: 0 | Status: SHIPPED | OUTPATIENT
Start: 2022-09-23 | End: 2022-10-19

## 2022-10-13 ENCOUNTER — OFFICE VISIT (OUTPATIENT)
Dept: FAMILY MEDICINE | Facility: CLINIC | Age: 22
End: 2022-10-13
Payer: OTHER MISCELLANEOUS

## 2022-10-13 ENCOUNTER — TELEPHONE (OUTPATIENT)
Dept: FAMILY MEDICINE | Facility: CLINIC | Age: 22
End: 2022-10-13

## 2022-10-13 ENCOUNTER — MYC MEDICAL ADVICE (OUTPATIENT)
Dept: FAMILY MEDICINE | Facility: CLINIC | Age: 22
End: 2022-10-13

## 2022-10-13 ENCOUNTER — HOSPITAL ENCOUNTER (EMERGENCY)
Facility: HOSPITAL | Age: 22
End: 2022-10-13
Payer: COMMERCIAL

## 2022-10-13 ENCOUNTER — NURSE TRIAGE (OUTPATIENT)
Dept: NURSING | Facility: CLINIC | Age: 22
End: 2022-10-13

## 2022-10-13 VITALS
TEMPERATURE: 98 F | OXYGEN SATURATION: 99 % | RESPIRATION RATE: 20 BRPM | SYSTOLIC BLOOD PRESSURE: 132 MMHG | HEART RATE: 70 BPM | WEIGHT: 175.9 LBS | BODY MASS INDEX: 28.39 KG/M2 | DIASTOLIC BLOOD PRESSURE: 92 MMHG
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
## 2022-10-13 DIAGNOSIS — F41.9 ANXIETY: ICD-10-CM

## 2022-10-13 PROCEDURE — 86803 HEPATITIS C AB TEST: CPT | Performed by: FAMILY MEDICINE

## 2022-10-13 PROCEDURE — 87389 HIV-1 AG W/HIV-1&-2 AB AG IA: CPT | Performed by: FAMILY MEDICINE

## 2022-10-13 PROCEDURE — 36415 COLL VENOUS BLD VENIPUNCTURE: CPT | Performed by: FAMILY MEDICINE

## 2022-10-13 PROCEDURE — 87340 HEPATITIS B SURFACE AG IA: CPT | Performed by: FAMILY MEDICINE

## 2022-10-13 PROCEDURE — 99212 OFFICE O/P EST SF 10 MIN: CPT | Performed by: FAMILY MEDICINE

## 2022-10-13 NOTE — TELEPHONE ENCOUNTER
Dirty needle stick at work    Recommended that she speak with her supervisor at work.    Cristina Cordon RN  [facility] Nurse Advisor  1:07 PM  10/13/2022      Reason for Disposition    EXPOSURE to a body fluid and from needlestick or a wound from a sharp object    SOURCE person is UNKNOWN (e.g., needle in garbage) AND needlestick within past 72 hours    Additional Information    Negative: SOURCE person is HIV POSITIVE AND needlestick within past 72 hours    Negative: SOURCE person is HEPATITIS B POSITIVE AND needlestick within past 7 days AND EXPOSED person NOT fully immunized against Hepatitis B (or does not know)    Negative: Puncture is on the head, neck, chest, abdomen or overlying a joint AND could be deep    Negative: Sensation of something still in the wound (i.e., needle broke off)    Negative: SOURCE person is HIGHER RISK (e.g., senior living inmate, injection drug user) AND needlestick within past 72 hours    Negative: Skin is split open or gaping (or length > 1/2 inch or 12 mm)    Negative: Patient sounds very sick or weak to the triager    Negative: Epinephrine (such as from Epi-Pen) injected into hand, finger, foot, or toe    Negative: Epinephrine injected into safe site (not into hand, finger, foot, or toe)  (Exception: Previously discharged injector.)    Protocols used: BLOOD AND BODY FLUID EXPOSURE-A-OH, [id number]

## 2022-10-13 NOTE — LETTER
REPORT OF WORK COMP    [place] [address] [address] [phone]      PATIENT DATA    Employee Name: Vero Lima      : 2000     SS#: [id number]    Work related injury: Yes  Employer at time of injury: [employer]  Employer contact & phone: [phone]  Employed elsewhere? No  Workers' Compensation Carrier/Managed Care Plan:  [employer]    Today's date: 10/13/2022  Date of injury: 10/13/22   Date of first visit: 10/13/22     PROVIDER EVALUATION: Please fill in as needed.  Please give copy to employee for employer.    1. Diagnosis: Needlestick    2. Treatment: testing.  3. Medication: none  NOTE: When ordering a medication, [place] Rules require Work Comp or WC on prescriptions.    4. No work from : N/A   5. Return to work date: 10/14/22     WITH RESTRICTIONS? No restrictions      RESTRICTIONS: Unlimited unless listed.  Restrictions apply to home and leisure also.  If work restrictions is not available, the employee is totally disabled.      Provider comments:  She will need repeat testing for HIV, Hep B and Hep C in 6 weeks, 3 months and 6 months.    Next appointment: 6 weeks    Recommend that the 2 group home residents that use ozempic needles get tested for HIV, Hep B and Hep C.        Sincerely,      Darlene Marcum MD       CC: Employer, Managed Care Plan/Payor, Patient

## 2022-10-13 NOTE — TELEPHONE ENCOUNTER
Forms/Letter Request    Type of form/letter: Work    Have you been seen for this request: Yes Today 10/13/22    Do we have the form/letter: Yes: Provider wrote letter today stating patient can go back to work tomorrow 10/14, however, due to patient anxiety she would like to not go back to work until 10/18/22 when she is scheduled next.    When is form/letter needed by: ASAP    How would you like the form/letter returned: Send to patient [employer]    Patient Notified form requests are processed in 3-5 business days:Yes    Could we send this information to you in [employer] or would you prefer to receive a phone call?:   Patient would like to be contacted via [employer]

## 2022-10-13 NOTE — PROGRESS NOTES
Spoke with patient on phone.  Patient is having a lot of anxiety with the stress of this needlestick.  She would like to be off of work tomorrow so that she can help process the information.  She feels too anxious to go back to work so soon.  I did a note for her to be off work tomorrow.  I offered a referral for counseling and patient declined.    Clinical Decision Making:    At the end of the encounter, I discussed results, diagnosis, medications. Discussed red flags for immediate return to clinic/ER, as well as indications for follow up if no improvement. Patient understood and agreed to plan. Patient was stable for discharge.      ICD-10-CM    1. Accidental needlestick injury with exposure to body fluid  W46.1XXA HIV Antigen Antibody Combo     Hepatitis B surface antigen     Hepatitis C Screen Reflex to HCV RNA Quant and Genotype        Testing today for HIV, hepatitis B and hepatitis C  The 2 group home residents who uses attic needles are reportedly negative for HIV, hepatitis B hepatitis C so no postexposure prophylaxis is currently needed.  I am recommending that those to residents get tested for HIV, hepatitis B and hepatitis C.  Patient will need to follow-up with her primary and get tested again in 6 weeks, 3 months and 6 months.  Recommended using condoms until her testing results are complete at least through 3 months.  It would be most safe to do that for 6 months.  Watch for signs of infection at the needlestick entry area such as redness, swelling or pain.  Follow-up if that occurs.  Patient verbalized understanding  Work note printed      There are no Patient Instructions on file for this visit.   No follow-ups on file.      chief complaint    HPI:  Vero Lima is a 22 year old female who presents today complaining of needlestick exposure today.  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  She accidentally bumped the [employer] sharps box and it was completely full and fell  over.  She tried to pick it up quickly before needles filled out but she did get poked by a needle in her right middle finger.  It betsey blood and she washed it right away with soap and water.  She is certain that it was an Ozempic needle.  At the group home there are 2 residents who uses epic needles.  The residents at this group home have brain injuries and they do not go out and socialize with other people.  Her employer has reported to her that these 2 dividual's are negative for HIV, hepatitis B and hepatitis C.    History obtained from the patient.    Past medical history, allergies, medicines, social history reviewed but excluded from note.    Review of systems  negative except listed in HPI    Vitals:    10/13/22 1441   BP: (!) 132/92   BP Location: Right arm   Patient Position: Sitting   Cuff Size: Adult Regular   Pulse: 70   Resp: 20   Temp: 98  F (36.7  C)   TempSrc: Oral   SpO2: 99%   Weight: 79.8 kg (175 lb 14.4 oz)       Physical Exam  Vitals noted and within normal limits  In general she is alert, oriented, and in no acute distress  Right hand middle finger with a pinpoint area noted where the needlestick was on the distal tip.  Skin is not open.  No surrounding erythema.

## 2022-10-13 NOTE — TELEPHONE ENCOUNTER
Dr. Darlene Anderson called and spoke with patient. Updated work note has been added via [id number].    Shane Gamez MA on 10/13/2022 at 5:49 PM

## 2022-10-14 LAB
HBV SURFACE AG SERPL QL IA: NONREACTIVE
HCV AB SERPL QL IA: NONREACTIVE
HIV 1+2 AB+HIV1 P24 AG SERPL QL IA: NONREACTIVE

## 2022-10-19 ENCOUNTER — MYC REFILL (OUTPATIENT)
Dept: FAMILY MEDICINE | Facility: CLINIC | Age: 22
End: 2022-10-19

## 2022-10-19 DIAGNOSIS — B00.9 HSV (HERPES SIMPLEX VIRUS) INFECTION: ICD-10-CM

## 2022-10-20 RX ORDER — VALACYCLOVIR HYDROCHLORIDE 1 G/1
2000 TABLET, FILM COATED ORAL 2 TIMES DAILY
Qty: 32 TABLET | Refills: 0 | Status: SHIPPED | OUTPATIENT
Start: 2022-10-20 | End: 2022-11-04

## 2022-10-22 ENCOUNTER — MYC MEDICAL ADVICE (OUTPATIENT)
Dept: FAMILY MEDICINE | Facility: CLINIC | Age: 22
End: 2022-10-22

## 2022-10-24 ENCOUNTER — HOSPITAL ENCOUNTER (EMERGENCY)
Facility: HOSPITAL | Age: 22
Discharge: HOME | End: 2022-10-24
Payer: COMMERCIAL

## 2022-10-24 VITALS
HEIGHT: 66 IN | WEIGHT: 175 LBS | RESPIRATION RATE: 18 BRPM | DIASTOLIC BLOOD PRESSURE: 82 MMHG | BODY MASS INDEX: 28.12 KG/M2 | TEMPERATURE: 97.9 F | HEART RATE: 78 BPM | OXYGEN SATURATION: 99 % | SYSTOLIC BLOOD PRESSURE: 132 MMHG

## 2022-10-24 DIAGNOSIS — N89.8 VAGINAL DISCHARGE: ICD-10-CM

## 2022-10-24 DIAGNOSIS — N72 CERVICITIS: ICD-10-CM

## 2022-10-24 DIAGNOSIS — Z11.3 SCREEN FOR STD (SEXUALLY TRANSMITTED DISEASE): ICD-10-CM

## 2022-10-24 LAB
ALBUMIN UR-MCNC: NEGATIVE MG/DL
APPEARANCE UR: CLEAR
BILIRUB UR QL STRIP: NEGATIVE
CLUE CELLS: NORMAL
COLOR UR AUTO: ABNORMAL
GLUCOSE UR STRIP-MCNC: NEGATIVE MG/DL
HCG UR QL: NEGATIVE
HGB UR QL STRIP: NEGATIVE
KETONES UR STRIP-MCNC: NEGATIVE MG/DL
LEUKOCYTE ESTERASE UR QL STRIP: ABNORMAL
MUCOUS THREADS #/AREA URNS LPF: PRESENT /LPF
NITRATE UR QL: NEGATIVE
PH UR STRIP: 6 [PH] (ref 5–7)
RBC URINE: 1 /HPF
SP GR UR STRIP: 1.01 (ref 1–1.03)
SQUAMOUS EPITHELIAL: 1 /HPF
TRICHOMONAS, WET PREP: NORMAL
UROBILINOGEN UR STRIP-MCNC: <2 MG/DL
WBC URINE: 2 /HPF
WBC'S/HIGH POWER FIELD, WET PREP: NORMAL
YEAST, WET PREP: NORMAL

## 2022-10-24 PROCEDURE — 87210 SMEAR WET MOUNT SALINE/INK: CPT | Performed by: EMERGENCY MEDICINE

## 2022-10-24 PROCEDURE — 99284 EMERGENCY DEPT VISIT MOD MDM: CPT | Mod: 25

## 2022-10-24 PROCEDURE — 81025 URINE PREGNANCY TEST: CPT | Performed by: EMERGENCY MEDICINE

## 2022-10-24 PROCEDURE — 250N000011 HC RX IP 250 OP 636

## 2022-10-24 PROCEDURE — 250N000009 HC RX 250

## 2022-10-24 PROCEDURE — 87491 CHLMYD TRACH DNA AMP PROBE: CPT

## 2022-10-24 PROCEDURE — 81001 URINALYSIS AUTO W/SCOPE: CPT | Performed by: EMERGENCY MEDICINE

## 2022-10-24 PROCEDURE — 96372 THER/PROPH/DIAG INJ SC/IM: CPT

## 2022-10-24 PROCEDURE — 87591 N.GONORRHOEAE DNA AMP PROB: CPT

## 2022-10-24 RX ORDER — DOXYCYCLINE 100 MG/1
100 CAPSULE ORAL 2 TIMES DAILY
Qty: 14 CAPSULE | Refills: 0 | Status: SHIPPED | OUTPATIENT
Start: 2022-10-24 | End: 2022-10-31

## 2022-10-24 RX ORDER — NYSTATIN 100000 U/G
CREAM TOPICAL DAILY
Qty: 15 G | Refills: 0 | Status: SHIPPED | OUTPATIENT
Start: 2022-10-24 | End: 2022-11-07

## 2022-10-24 RX ORDER — FLUCONAZOLE 150 MG/1
150 TABLET ORAL ONCE
Qty: 1 TABLET | Refills: 0 | Status: SHIPPED | OUTPATIENT
Start: 2022-10-24 | End: 2022-10-24

## 2022-10-24 RX ADMIN — CEFTRIAXONE SODIUM 250 MG: 1 INJECTION, POWDER, FOR SOLUTION INTRAMUSCULAR; INTRAVENOUS at 15:13

## 2022-10-24 ASSESSMENT — ENCOUNTER SYMPTOMS
VOMITING: 0
HEMATURIA: 0
FEVER: 0
ABDOMINAL PAIN: 0
WOUND: 0
NAUSEA: 0
CHILLS: 0
DYSURIA: 0
FREQUENCY: 0

## 2022-10-24 NOTE — ED NOTES
"ED Triage Provider Note  [facility] EMERGENCY DEPARTMENT  Encounter Date: Oct 24, 2022    History:  Chief Complaint   Patient presents with     Vaginal Problem     Vero Lima is a 22 year old female who presents to the ED with itchy clumpy vaginal discharge.     Review of Systems:  No fever    Exam:  /82   Pulse 78   Temp 97.9  F (36.6  C) (Temporal)   Resp 18   Ht 1.676 m (5' 6\")   Wt 79.4 kg (175 lb)   LMP 10/10/2022 (Exact Date)   SpO2 99%   BMI 28.25 kg/m    General: No acute distress. Appears stated age.   Cardio: normal rate, extremities well perfused  Resp: Normal work of breathing, grossly normal respiratory rate  Neuro: Alert. Facial movement grossly symmetric. Grossly intact strength.       Medical Decision Making:  Patient arriving to the ED with problem as above. A medical screening exam was performed. Initial differential diagnosis includes but not limited to yeast vaginitis.    12:32 PM  orders initiated from Triage. The patient is most appropriate to return to the waiting room.       Gina Cameron MD  10/24/2022 at 12:32 PM     Gina Cameron MD  10/24/22 1232    "

## 2022-10-24 NOTE — ED TRIAGE NOTES
-Patient arrives from home with concerns for a yeast infection. Patient has been using over the counter medication without results. Also reports red and white bumps in the back of her throat that are without pain. Patient alert.      Triage Assessment     Row Name 10/24/22 1046       Triage Assessment (Adult)    Airway WDL WDL       Cognitive/Neuro/Behavioral WDL    Cognitive/Neuro/Behavioral WDL WDL

## 2022-10-24 NOTE — Clinical Note
Vero Lima was seen and treated in our emergency department on 10/24/2022.  She may return to work on 10/25/2022.       If you have any questions or concerns, please don't hesitate to call.      Fabiola Paniagua PA-C

## 2022-10-24 NOTE — ED NOTES
Patient with no observed s/s of allergic reaction post IM injection. Verbalized understanding of discharge instructions.  Patient ambulatory out of department without difficulties.

## 2022-10-24 NOTE — DISCHARGE INSTRUCTIONS
Please bring this paperwork with you to your follow-up appointment.    You were seen in the urgent care/emergency department for vaginal discharge and irritation.     We suspect you could have vaginitis due to irritation. Your testing today was negative for bacterial vaginosis, yeast infection or trichomonas.     Your chlamydia and gonorrhea tests are pending. We will call you if these are positive. We will treat you prophylactically with one dose of Rocephin here in the ER and home with the antibiotic doxycycline twice a day for 7 days.     For your symptoms:  Keep the area clean by using mild soap and water.  Nystatin cream for itching  Diflucan 1x for possible yeast infection    Tylenol/ibuprofen as needed  You may take up to 650 mg of Tylenol (acetaminophen) up to 4 times daily and up to 600 mg of ibuprofen up to 4 times daily as needed for fever, pain.  Please do not take more than the daily maximum recommended dose (tylenol = 4 grams, ibuprofen = 2.4 grams) as it can cause harm to your liver, kidneys, stomach.  It is best to take ibuprofen with food. Please read labels of any over-the-counter medicine you may be taking as it may contain Tylenol (acetaminophen) or Advil (ibuprofen).     Follow up with your primary care provider for recheck in 1-2 days for ER follow up.     Return to the emergency department if you develop worsening pain, bleeding, discharge, fevers, chills, urinary symptoms, or any other new worsening or concerning symptoms. We'd be happy to see you again.    Thank you for allowing us to be part of your care today.    Take care!  -Fabiola Paniagua PA-C

## 2022-10-24 NOTE — TELEPHONE ENCOUNTER
Please call patient :   Recommendation:   Antifungal vaginal cream is available over-the-counter  Use as directed and follow up in person if not better.

## 2022-10-24 NOTE — ED PROVIDER NOTES
EMERGENCY DEPARTMENT ENCOUNTER      NAME: eVro Lima  AGE: 22 year old female  YOB: 2000  MRN: [id number]  EVALUATION DATE & TIME: No admission date for patient encounter.    PCP: Gabrielle Bergman    ED PROVIDER: Fabiola Paniagua PA-C    Chief Complaint   Patient presents with     Vaginal Problem     FINAL IMPRESSION:  1. Vaginal discharge    2. Cervicitis    3. Screen for STD (sexually transmitted disease)      MEDICAL DECISION MAKING:    Pertinent Labs & Imaging studies reviewed. (See chart for details)  Vero Lima is a 22 year old female who presents for evaluation of vaginal discharge, itching x3 days.  History of yeast infections, this feels similar to previous.  Tried calling her primary care provider for medication as well as self prescribing over-the-counter creams, without relief.  Concern for STD and would like testing as well.     On my initial evaluation, vital signs normal. On physical exam patient is awake, alert, no acute distress, resting comfortably in chair.  Heart sounds are normal, lungs are clear.  No tenderness on palpation of her abdomen, no distention, rebound, guarding or masses.  Normal bowel sounds.  No CVA tenderness bilaterally.  On pelvic exam with chaperone present, she does have whitish-yellowish tinged mucus coming from cervix and surrounding vaginal walls, no cervical erythema, petechiae or clots, no skin lesions or rashes, no foreign body.    Differential diagnosis includes normal vaginal mucosa, cervicitis, vaginitis, BV, candidal yeast infection, trichomonas, chlamydia, gonorrhea, other STD, PID, pregnancy, vaginal atrophy. Emergency department workup included pelvic exam, urine, urine Preg, gonorrhea, chlamydia swabs, wet prep.     Urine negative for infection.  Urine pregnant negative, so low suspicion is pregnancy for cause of symptoms.  Wet prep negative for BV, Candida, trichomoniasis.  Did do cervical swabs for gonorrhea and Chlamydia testing,  "these were sent and did let patient know that we will call her if these are positive.  Patient requested to be treated prophylactically for both gonorrhea and chlamydia, so was given Rocephin here and sent home with doxycycline.  Patient also requested prophylactic treatment for possible yeast infection as she reported that she \"was not sure if she did the swabs right\".  So did send in one-time dose of fluconazole and given nystatin cream.     Unclear cause of symptoms thus far, could possibly be chlamydia or gonorrhea as these are not resulted.  Low suspicion for foreign body is not visualized.  Low suspicion for HSV or genital warts as no skin changes or lesions visualized on exam.  Did recommend she follow-up with her OB/GYN for further management if her symptoms do not improve after these interventions.  Added strict return precautions to the emergency department, patient verbalized understanding and was in agreement with this plan.    Patient has had serial examinations and notes significant improvement.     Patient was discharged in stable condition with treatment plan as below. Instructed to follow up with primary care provider in 3 days and with OBGYN and return to the emergency department with any new or worsening of symptoms. Patient expressed understanding, feels comfortable, and is in agreement with this plan. All questions addressed prior to discharge.    Medical Decision Making    Supplemental history from: N/A    External Record(s) Reviewed: N/A    Differential Diagnosis: See MDM charting for differential considered.     I performed an independent interpretation of the: N/A    Discussed with radiology regarding test interpretation: N/A    Discussion of management with another provider: N/A    The following testing was considered but ultimately not selected: None     I considered prescription management with: Symptomatic Management and Other:antifungal and abx    The patient's care impacted: " None    Consideration of Admission/Observation: N/A - Patient admitted or discharged without consideration for admission    Care significantly affected by Social Determinants of Health including: N/A    ED COURSE:  1:43 PM  I reviewed the patient's chart. I met with the patient to gather history and to perform my initial exam. Updated the patient on findings.    I wore appropriate PPE during this encounter including: facemask & eye protection   2:43 PM Rechecked and updated patient. Performed pelvic exam and swabs were sent. We discussed plan for discharge including treatment plan, follow-up and return precautions to emergency department.  Patient voiced understanding and in agreement with this plan.    At the conclusion of the encounter I discussed the results of all of the tests and the disposition. The questions were answered. The patient or family acknowledged understanding and was agreeable with the care plan.     MEDICATIONS GIVEN IN THE EMERGENCY:  Medications   cefTRIAXone (ROCEPHIN) 250 mg in lidocaine (PF) (XYLOCAINE) 1 % injection (250 mg Intramuscular Given 10/24/22 1513)       NEW PRESCRIPTIONS STARTED AT TODAY'S ER VISIT  Discharge Medication List as of 10/24/2022  3:15 PM      START taking these medications    Details   doxycycline hyclate (VIBRAMYCIN) 100 MG capsule Take 1 capsule (100 mg) by mouth 2 times daily for 7 days, Disp-14 capsule, R-0, E-Prescribe      fluconazole (DIFLUCAN) 150 MG tablet Take 1 tablet (150 mg) by mouth once for 1 dose, Disp-1 tablet, R-0, E-Prescribe      nystatin (MYCOSTATIN) 546168 UNIT/GM external cream Apply topically daily for 14 daysDisp-15 g, [id number]                  =================================================================    HPI:    Patient information was obtained from: Patient     Use of Interpretor: N/A    Verosylwia Lima is a 22 year old female with a pertinent medical history of HSV-1 infection who presents to this ED via walk-in for evaluation  of vaginal irritation.    Patient reports that approximately 3 days ago she developed vaginal irritation characterized as vaginal itching with associated redness. She notes that she recently had one episode of very minimal vaginal bleeding after she wiped her vagina with paper, but she mentions that this is the only time she had any vaginal bleeding and states that she believes this just came from wiping too hard. She endorses using Monistat for her symptoms for the first two days, but denies any relief. She endorses having similar symptoms a couple years ago secondary to a yeast infection, but notes that her current symptoms are more significant in intensity. She states that at that time she resolved her yeast infection via Fluconazole. She denies having any new sexual partners recently. She denies any STD concerns. She denies any chance of pregnancy. She additionally reports recently developing red and white bumps on at the back of her tongue recently, but she denies any recent skin lesions, rash, urinary frequency, urinary urgency, dysuria, hematuria, abdominal pain, fever, chills, nausea, vomiting, or any other complications at this time. She mentions that she bellies that these white bumps on her tongue might be related to thrush.       REVIEW OF SYSTEMS:  Review of Systems   Constitutional: Negative for chills and fever.   HENT:        Positive for tongue bumps (red and white).   Gastrointestinal: Negative for abdominal pain, nausea and vomiting.   Genitourinary: Positive for vaginal bleeding (1x, very minimal). Negative for dysuria, frequency, hematuria and urgency.        Positive for vaginal irritation. Positive for vaginal itching. Positive for vaginal redness.    Skin: Negative for rash and wound.        Negative for lesion.   All other systems reviewed and are negative.      PAST MEDICAL HISTORY:  Past Medical History:   Diagnosis Date     Depression      Gestational hypertension 4/9/2021    Developed  "during labor only.  [facility] labs within normal limits, no proteinuria noted. No other signs and symptoms of preeclampsia noted other than pretibial edema.     Migraines        PAST SURGICAL HISTORY:  Past Surgical History:   Procedure Laterality Date     NO HISTORY OF SURGERY         CURRENT MEDICATIONS:    No current facility-administered medications for this encounter.    Current Outpatient Medications:      doxycycline hyclate (VIBRAMYCIN) 100 MG capsule, Take 1 capsule (100 mg) by mouth 2 times daily for 7 days, Disp: 14 capsule, Rfl: 0     fluconazole (DIFLUCAN) 150 MG tablet, Take 1 tablet (150 mg) by mouth once for 1 dose, Disp: 1 tablet, Rfl: 0     nystatin (MYCOSTATIN) 586852 UNIT/GM external cream, Apply topically daily for 14 days, Disp: 15 g, Rfl: 0     valACYclovir (VALTREX) 1000 mg tablet, Take 2 tablets (2,000 mg) by mouth 2 times daily, Disp: 32 tablet, Rfl: 0    ALLERGIES:  Allergies   Allergen Reactions     Tomato Hives       FAMILY HISTORY:  Family History   Problem Relation Age of Onset     Diabetes Type 2  Mother      Hypertension No family hx of      Cancer No family hx of      Coronary Artery Disease No family hx of      Hyperlipidemia No family hx of      Depression Mother      Diabetes Mother      Hypertension Mother        SOCIAL HISTORY:   Social History     Socioeconomic History     Marital status: Single     Years of education: 12   Tobacco Use     Smoking status: Every Day     Types: Vaping Device     Smokeless tobacco: Never   Vaping Use     Vaping Use: Never used   Substance and Sexual Activity     Alcohol use: Yes     Comment: Alcoholic Drinks/day: 1 per month     Drug use: Not Currently     Types: Marijuana     Sexual activity: Yes     Partners: Male     Birth control/protection: Abstinence       VITALS:  Patient Vitals for the past 24 hrs:   BP Temp Temp src Pulse Resp SpO2 Height Weight   10/24/22 1039 132/82 97.9  F (36.6  C) Temporal 78 18 99 % 1.676 m (5' 6\") 79.4 kg (175 " lb)       PHYSICAL EXAM    Constitutional: Well developed, Well nourished, NAD  HENT: Normocephalic, Atraumatic, Bilateral external ears normal, Oropharynx normal, mucous membranes moist, Nose normal.   Neck: Normal range of motion, No tenderness, Supple, No stridor.  Eyes: PERRL, EOMI, Conjunctiva normal, No discharge.   Respiratory: Normal breath sounds, No respiratory distress, No wheezing, Speaks full sentences easily. No cough.  Cardiovascular: Normal heart rate, Regular rhythm, No murmurs, No rubs, No gallops. Chest wall nontender.  GI: Soft, No tenderness, No masses, No flank tenderness. No rebound or guarding.  :  On pelvic exam with chaperone present, she does have whitish-yellowish tinged mucus coming from cervix and surrounding vaginal walls, no cervical erythema, petechiae or clots, no skin lesions or rashes, no foreign body.  No bleeding  Musculoskeletal: 2+ DP pulses. No edema. No cyanosis, No clubbing. Good range of motion in all major joints. No tenderness to palpation or major deformities noted. No tenderness of the CTLS spine.   Integument: Warm, Dry, No erythema, No rash. No petechiae.  Neurologic: Alert & oriented x 3, Normal motor function, Normal sensory function, No focal deficits noted. Normal gait.  Psychiatric: Affect normal, Judgment normal, Mood normal. Cooperative.    LAB:  All pertinent labs reviewed and interpreted.  Labs Ordered and Resulted from Time of ED Arrival to Time of ED Departure   ROUTINE UA WITH MICROSCOPIC REFLEX TO CULTURE - Abnormal       Result Value    Color Urine Light Yellow      Appearance Urine Clear      Glucose Urine Negative      Bilirubin Urine Negative      Ketones Urine Negative      Specific Gravity Urine 1.011      Blood Urine Negative      pH Urine 6.0      Protein Albumin Urine Negative      Urobilinogen Urine <2.0      Nitrite Urine Negative      Leukocyte Esterase Urine 25 Emeka/uL (*)     Mucus Urine Present (*)     RBC Urine 1      WBC Urine 2       Squamous Epithelials Urine 1     HCG QUALITATIVE URINE - Normal    hCG Urine Qualitative Negative     WET PREPARATION - Normal    Trichomonas Absent      Yeast Absent      Clue Cells Absent      WBCs/high power field None     NEISSERIA GONORRHOEAE PCR   CHLAMYDIA TRACHOMATIS PCR         Diagnosis:  1. Vaginal discharge    2. Cervicitis    3. Screen for STD (sexually transmitted disease)      I, Seamus Hickey, am serving as a scribe to document services personally performed by Fabiola Paniagua PA-C based on my observation and the provider's statements to me. I, Fabiola Paniagua PA-C attest that Seamus Lozada is acting in a scribe capacity, has observed my performance of the services and has documented them in accordance with my direction.    Fabiola Paniagua PA-C  Emergency Medicine  [facility]  10/24/2022       Fabiola Paniagua PA-C  10/24/22 1720

## 2022-10-25 ENCOUNTER — PATIENT OUTREACH (OUTPATIENT)
Dept: CARE COORDINATION | Facility: CLINIC | Age: 22
End: 2022-10-25

## 2022-10-25 LAB
C TRACH DNA SPEC QL NAA+PROBE: NEGATIVE
N GONORRHOEA DNA SPEC QL NAA+PROBE: NEGATIVE

## 2022-10-25 NOTE — PROGRESS NOTES
Clinic Care Coordination Contact    Follow Up Progress Note      Assessment: The pt was recently at the ED, I called to check up on the pt and help the pt setup a ED follow up. The pt was at [facility] for vaginal problem. I called and talked to the pt, pt stated that she is doing better. She stated that she is going to wait, to see if things get better, if not she will call to schedule.     Care Gaps:    Health Maintenance Due   Topic Date Due     NICOTINE/TOBACCO CESSATION COUNSELING Q 1 YR  Never done     YEARLY PREVENTIVE VISIT  Never done     ADVANCE CARE PLANNING  Never done     Pneumococcal Vaccine: Pediatrics (0 to 5 Years) and At-Risk Patients (6 to 64 Years) (1 - PCV) Never done     DTAP/TDAP/TD IMMUNIZATION (7 - Td or Tdap) 10/27/2020     COVID-19 Vaccine (2 - Pfizer series) 05/01/2022     INFLUENZA VACCINE (1) 09/01/2022           Care Plans      Intervention/Education provided during outreach:               Plan:     Care Coordinator will follow up in

## 2022-11-04 ENCOUNTER — MYC REFILL (OUTPATIENT)
Dept: FAMILY MEDICINE | Facility: CLINIC | Age: 22
End: 2022-11-04

## 2022-11-04 DIAGNOSIS — B00.9 HSV (HERPES SIMPLEX VIRUS) INFECTION: ICD-10-CM

## 2022-11-04 RX ORDER — VALACYCLOVIR HYDROCHLORIDE 1 G/1
2000 TABLET, FILM COATED ORAL 2 TIMES DAILY
Qty: 32 TABLET | Refills: 0 | Status: SHIPPED | OUTPATIENT
Start: 2022-11-04 | End: 2022-11-28

## 2022-11-21 ENCOUNTER — HEALTH MAINTENANCE LETTER (OUTPATIENT)
Age: 22
End: 2022-11-21

## 2022-11-28 ENCOUNTER — MYC REFILL (OUTPATIENT)
Dept: FAMILY MEDICINE | Facility: CLINIC | Age: 22
End: 2022-11-28

## 2022-11-28 DIAGNOSIS — B00.9 HSV (HERPES SIMPLEX VIRUS) INFECTION: ICD-10-CM

## 2022-11-28 RX ORDER — VALACYCLOVIR HYDROCHLORIDE 1 G/1
2000 TABLET, FILM COATED ORAL 2 TIMES DAILY
Qty: 32 TABLET | Refills: 0 | Status: SHIPPED | OUTPATIENT
Start: 2022-11-28 | End: 2023-02-02

## 2023-02-02 ENCOUNTER — MYC REFILL (OUTPATIENT)
Dept: FAMILY MEDICINE | Facility: CLINIC | Age: 23
End: 2023-02-02
Payer: COMMERCIAL

## 2023-02-02 DIAGNOSIS — B00.9 HSV (HERPES SIMPLEX VIRUS) INFECTION: ICD-10-CM

## 2023-02-02 RX ORDER — VALACYCLOVIR HYDROCHLORIDE 1 G/1
2000 TABLET, FILM COATED ORAL 2 TIMES DAILY
Qty: 32 TABLET | Refills: 0 | Status: SHIPPED | OUTPATIENT
Start: 2023-02-02 | End: 2023-03-07

## 2023-02-10 NOTE — LETTER
Health Maintenance Due   Topic Date Due   • Diabetes Eye Exam  07/01/2016   • Medicare Advantage- Medicare Wellness Visit  01/01/2023       Patient is due for topics as listed above but is not proceeding with Diabetes Eye Exam and MWV (Medicare Wellness Visit) at this time.    October 13, 2022      Vero SANDERS Lima  [address]   [place] [address]        To Whom It May Concern:    Vero SANDERS Lima was seen on 10/13/22.  Please excuse her from work on 10/14/2022.        Sincerely,        Darlene Marcum MD

## 2023-03-07 ENCOUNTER — MYC REFILL (OUTPATIENT)
Dept: FAMILY MEDICINE | Facility: CLINIC | Age: 23
End: 2023-03-07
Payer: COMMERCIAL

## 2023-03-07 DIAGNOSIS — B00.9 HSV (HERPES SIMPLEX VIRUS) INFECTION: ICD-10-CM

## 2023-03-08 RX ORDER — VALACYCLOVIR HYDROCHLORIDE 1 G/1
2000 TABLET, FILM COATED ORAL 2 TIMES DAILY
Qty: 32 TABLET | Refills: 0 | Status: SHIPPED | OUTPATIENT
Start: 2023-03-08 | End: 2023-04-20

## 2023-04-20 ENCOUNTER — MYC REFILL (OUTPATIENT)
Dept: FAMILY MEDICINE | Facility: CLINIC | Age: 23
End: 2023-04-20
Payer: COMMERCIAL

## 2023-04-20 DIAGNOSIS — B00.9 HSV (HERPES SIMPLEX VIRUS) INFECTION: ICD-10-CM

## 2023-04-21 RX ORDER — VALACYCLOVIR HYDROCHLORIDE 1 G/1
2000 TABLET, FILM COATED ORAL 2 TIMES DAILY
Qty: 32 TABLET | Refills: 0 | Status: SHIPPED | OUTPATIENT
Start: 2023-04-21 | End: 2023-04-26

## 2023-04-26 ENCOUNTER — MYC REFILL (OUTPATIENT)
Dept: FAMILY MEDICINE | Facility: CLINIC | Age: 23
End: 2023-04-26
Payer: COMMERCIAL

## 2023-04-26 DIAGNOSIS — B00.9 HSV (HERPES SIMPLEX VIRUS) INFECTION: ICD-10-CM

## 2023-04-27 RX ORDER — VALACYCLOVIR HYDROCHLORIDE 1 G/1
2000 TABLET, FILM COATED ORAL 2 TIMES DAILY
Qty: 32 TABLET | Refills: 0 | Status: SHIPPED | OUTPATIENT
Start: 2023-04-27 | End: 2023-06-13

## 2023-05-02 ENCOUNTER — MYC MEDICAL ADVICE (OUTPATIENT)
Dept: FAMILY MEDICINE | Facility: CLINIC | Age: 23
End: 2023-05-02
Payer: COMMERCIAL

## 2023-06-04 ENCOUNTER — MYC REFILL (OUTPATIENT)
Dept: FAMILY MEDICINE | Facility: CLINIC | Age: 23
End: 2023-06-04
Payer: COMMERCIAL

## 2023-06-04 DIAGNOSIS — B00.9 HSV (HERPES SIMPLEX VIRUS) INFECTION: ICD-10-CM

## 2023-06-04 NOTE — LETTER
6/4/2023      RE: Vero SANDERS Lima  [address]  [place] [address]          Randall Pham,       I hope you are doing fine.   Please give us a call at [phone] to schedule an appointment regarding your Valtrex medication that you were trying to refill. Unfortunately, I will have to see you and further discuss about this medication so please call us and we'll help you schedule that.       Thank you,      Gabrielle Bergman MD

## 2023-06-05 RX ORDER — VALACYCLOVIR HYDROCHLORIDE 1 G/1
2000 TABLET, FILM COATED ORAL 2 TIMES DAILY
Qty: 32 TABLET | Refills: 0 | OUTPATIENT
Start: 2023-06-05

## 2023-06-05 NOTE — TELEPHONE ENCOUNTER
Called and spoke to pt briefly but call got hangup. Call back and unable to reach her. LVM to schedule an appointment -KRISTIN ABEL

## 2023-06-13 ENCOUNTER — VIRTUAL VISIT (OUTPATIENT)
Dept: FAMILY MEDICINE | Facility: CLINIC | Age: 23
End: 2023-06-13
Payer: COMMERCIAL

## 2023-06-13 DIAGNOSIS — M54.32 SCIATICA OF LEFT SIDE: ICD-10-CM

## 2023-06-13 DIAGNOSIS — B00.9 HSV (HERPES SIMPLEX VIRUS) INFECTION: Primary | ICD-10-CM

## 2023-06-13 PROCEDURE — 99213 OFFICE O/P EST LOW 20 MIN: CPT | Mod: VID | Performed by: FAMILY MEDICINE

## 2023-06-13 RX ORDER — CYCLOBENZAPRINE HCL 5 MG
5 TABLET ORAL 3 TIMES DAILY PRN
Qty: 30 TABLET | Refills: 0 | Status: SHIPPED | OUTPATIENT
Start: 2023-06-13 | End: 2024-05-16

## 2023-06-13 RX ORDER — VALACYCLOVIR HYDROCHLORIDE 1 G/1
2000 TABLET, FILM COATED ORAL 2 TIMES DAILY
Qty: 8 TABLET | Refills: 4 | Status: SHIPPED | OUTPATIENT
Start: 2023-06-13 | End: 2024-05-16

## 2023-09-16 ENCOUNTER — HEALTH MAINTENANCE LETTER (OUTPATIENT)
Age: 23
End: 2023-09-16

## 2024-05-16 ENCOUNTER — VIRTUAL VISIT (OUTPATIENT)
Dept: FAMILY MEDICINE | Facility: CLINIC | Age: 24
End: 2024-05-16

## 2024-05-16 DIAGNOSIS — B00.9 HSV (HERPES SIMPLEX VIRUS) INFECTION: ICD-10-CM

## 2024-05-16 DIAGNOSIS — F41.9 ANXIETY: Primary | ICD-10-CM

## 2024-05-16 PROCEDURE — 99214 OFFICE O/P EST MOD 30 MIN: CPT | Mod: 95 | Performed by: NURSE PRACTITIONER

## 2024-05-16 RX ORDER — VALACYCLOVIR HYDROCHLORIDE 1 G/1
2000 TABLET, FILM COATED ORAL 2 TIMES DAILY
Qty: 8 TABLET | Refills: 4 | Status: SHIPPED | OUTPATIENT
Start: 2024-05-16

## 2024-05-16 RX ORDER — SERTRALINE HYDROCHLORIDE 25 MG/1
25 TABLET, FILM COATED ORAL DAILY
Qty: 30 TABLET | Refills: 0 | Status: SHIPPED | OUTPATIENT
Start: 2024-05-16

## 2024-05-16 RX ORDER — HYDROXYZINE HYDROCHLORIDE 10 MG/1
10-20 TABLET, FILM COATED ORAL 3 TIMES DAILY PRN
Qty: 30 TABLET | Refills: 0 | Status: SHIPPED | OUTPATIENT
Start: 2024-05-16

## 2024-05-16 NOTE — LETTER
May 16, 2024      Vreo SANDERS Lima  [address]     [facility] [address]        To Whom It May Concern:    Vero SANDERS Lima suffers from anxiety.  Please allow her to house an emotional support animal (dog).        Sincerely,           Debra Grace, CNP
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
## 2024-05-29 ENCOUNTER — OFFICE VISIT (OUTPATIENT)
Dept: FAMILY MEDICINE | Facility: CLINIC | Age: 24
End: 2024-05-29

## 2024-05-29 VITALS
OXYGEN SATURATION: 96 % | DIASTOLIC BLOOD PRESSURE: 89 MMHG | TEMPERATURE: 98.4 F | SYSTOLIC BLOOD PRESSURE: 131 MMHG | WEIGHT: 175.2 LBS | HEART RATE: 75 BPM | BODY MASS INDEX: 28.28 KG/M2 | RESPIRATION RATE: 16 BRPM

## 2024-05-29 DIAGNOSIS — H10.33 ACUTE BACTERIAL CONJUNCTIVITIS OF BOTH EYES: Primary | ICD-10-CM

## 2024-05-29 DIAGNOSIS — R07.0 THROAT PAIN: ICD-10-CM

## 2024-05-29 LAB
DEPRECATED S PYO AG THROAT QL EIA: NEGATIVE
GROUP A STREP BY PCR: NOT DETECTED

## 2024-05-29 PROCEDURE — 99213 OFFICE O/P EST LOW 20 MIN: CPT | Performed by: PHYSICIAN ASSISTANT

## 2024-05-29 PROCEDURE — 87651 STREP A DNA AMP PROBE: CPT | Performed by: PHYSICIAN ASSISTANT

## 2024-05-29 RX ORDER — POLYMYXIN B SULFATE AND TRIMETHOPRIM 1; 10000 MG/ML; [USP'U]/ML
1-2 SOLUTION OPHTHALMIC EVERY 4 HOURS
Qty: 5 ML | Refills: 0 | Status: SHIPPED | OUTPATIENT
Start: 2024-05-29 | End: 2024-06-05

## 2024-05-29 NOTE — LETTER
[facility]  [address] [address]  Phone: [phone]  Fax: [phone]    May 29, 2024        Vero SANDERS Lima  [address]     [facility] [address]          To whom it may concern:    RE: Vero SANDERS Lima    She is excused from work for 5/29/2024 - 5/30/2024. May then return as tolerated as long as no fever of 100.4 or higher.    Please contact me for questions or concerns.      Sincerely,      Dee Carpio

## 2024-05-29 NOTE — PROGRESS NOTES
Assessment & Plan:      Problem List Items Addressed This Visit    None  Visit Diagnoses       Acute bacterial conjunctivitis of both eyes    -  Primary    Relevant Medications    polymixin b-trimethoprim (POLYTRIM) 97014-5.1 UNIT/ML-% ophthalmic solution    Throat pain        Relevant Orders    Streptococcus A Rapid Screen w/Reflex to PCR - Clinic Collect (Completed)    Group A Streptococcus PCR Throat Swab          Medical Decision Making  Patient presents for eye redness but is also noted to have rhinorrhea and sore throat.  Rapid strep is negative.  Viral upper respiratory infection is also possible, however we will still treat with antibiotic eyedrops for suspected bacterial conjunctivitis.  Provided note for work as requested.  Discussed treatment and symptomatic care.  Allergies and medication interactions reviewed.  Discussed signs of worsening symptoms and when to follow-up with PCP if no symptom improvement.     Subjective:      Vero Lima is a 24 year old female here for evaluation of eye redness, rhinorrhea, sore throat.  Onset of symptoms was yesterday.  Patient noted eye puffiness in the right eye that has not spread to the left eye.  She woke up this morning with goopy discharge.  She also notes slight throat drainage, mild cough, throat irritation.  No fevers.  Patient has no history of seasonal allergies.  Work is requiring a note.     The following portions of the patient's history were reviewed and updated as appropriate: allergies, current medications, and problem list.     Review of Systems  Pertinent items are noted in HPI.    Allergies  Allergies   Allergen Reactions    Tomato Hives       Family History   Problem Relation Age of Onset    Diabetes Type 2  Mother     Hypertension No family hx of     Cancer No family hx of     Coronary Artery Disease No family hx of     Hyperlipidemia No family hx of     Depression Mother     Diabetes Mother     Hypertension Mother        Social History      Tobacco Use    Smoking status: Every Day     Types: Vaping Device     Passive exposure: Current    Smokeless tobacco: Never   Substance Use Topics    Alcohol use: Yes     Comment: Alcoholic Drinks/day: 1 per month        Objective:      /89   Pulse 75   Temp 98.4  F (36.9  C)   Resp 16   Wt 79.5 kg (175 lb 3.2 oz)   LMP 05/23/2024 (Exact Date)   SpO2 96%   BMI 28.28 kg/m    General appearance - alert, well appearing, and in no distress and non-toxic  Eyes -conjunctivae/sclera injected bilaterally with mild eye puffiness of the upper and lower eyelids  Ears - bilateral TM's and external ear canals normal  Nose - normal and patent, no erythema, discharge or polyps  Mouth -mild to moderate tonsillar swelling with slight erythema, slight exudates seen on the right tonsil  Neck - supple, no significant adenopathy     Lab & Imaging Results    Results for orders placed or performed in visit on 05/29/24   Streptococcus A Rapid Screen w/Reflex to PCR - Clinic Collect     Status: Normal    Specimen: Throat; Swab   Result Value Ref Range    Group A Strep antigen Negative Negative       I personally reviewed these results and discussed findings with the patient.    The use of Dragon/Nuserv dictation services was used to construct the content of this note; any grammatical errors are non-intentional. Please contact the author directly if you are in need of any clarification.

## 2024-09-26 ENCOUNTER — HOSPITAL ENCOUNTER (EMERGENCY)
Facility: HOSPITAL | Age: 24
Discharge: HOME | End: 2024-09-26
Attending: EMERGENCY MEDICINE | Admitting: EMERGENCY MEDICINE
Payer: COMMERCIAL

## 2024-09-26 ENCOUNTER — APPOINTMENT (OUTPATIENT)
Dept: ULTRASOUND IMAGING | Facility: HOSPITAL | Age: 24
End: 2024-09-26
Attending: EMERGENCY MEDICINE
Payer: COMMERCIAL

## 2024-09-26 VITALS
HEIGHT: 67 IN | RESPIRATION RATE: 16 BRPM | DIASTOLIC BLOOD PRESSURE: 67 MMHG | HEART RATE: 69 BPM | OXYGEN SATURATION: 100 % | BODY MASS INDEX: 26.89 KG/M2 | TEMPERATURE: 98 F | SYSTOLIC BLOOD PRESSURE: 122 MMHG | WEIGHT: 171.3 LBS

## 2024-09-26 DIAGNOSIS — O46.8X1 SUBCHORIONIC HEMATOMA IN FIRST TRIMESTER, SINGLE OR UNSPECIFIED FETUS: ICD-10-CM

## 2024-09-26 DIAGNOSIS — O20.0 THREATENED MISCARRIAGE: ICD-10-CM

## 2024-09-26 DIAGNOSIS — O41.8X10 SUBCHORIONIC HEMATOMA IN FIRST TRIMESTER, SINGLE OR UNSPECIFIED FETUS: ICD-10-CM

## 2024-09-26 LAB
ABO/RH(D): NORMAL
ANION GAP SERPL CALCULATED.3IONS-SCNC: 11 MMOL/L (ref 7–15)
ANTIBODY SCREEN: NEGATIVE
BASOPHILS # BLD AUTO: 0.1 10E3/UL (ref 0–0.2)
BASOPHILS NFR BLD AUTO: 1 %
BUN SERPL-MCNC: 5.7 MG/DL (ref 6–20)
CALCIUM SERPL-MCNC: 8.8 MG/DL (ref 8.8–10.4)
CHLORIDE SERPL-SCNC: 106 MMOL/L (ref 98–107)
CREAT SERPL-MCNC: 0.57 MG/DL (ref 0.51–0.95)
EGFRCR SERPLBLD CKD-EPI 2021: >90 ML/MIN/1.73M2
EOSINOPHIL # BLD AUTO: 0 10E3/UL (ref 0–0.7)
EOSINOPHIL NFR BLD AUTO: 1 %
ERYTHROCYTE [DISTWIDTH] IN BLOOD BY AUTOMATED COUNT: 16.2 % (ref 10–15)
GLUCOSE SERPL-MCNC: 105 MG/DL (ref 70–99)
HCG INTACT+B SERPL-ACNC: ABNORMAL MIU/ML
HCO3 SERPL-SCNC: 20 MMOL/L (ref 22–29)
HCT VFR BLD AUTO: 40.8 % (ref 35–47)
HGB BLD-MCNC: 13.1 G/DL (ref 11.7–15.7)
HOLD SPECIMEN: NORMAL
IMM GRANULOCYTES # BLD: 0 10E3/UL
IMM GRANULOCYTES NFR BLD: 0 %
LYMPHOCYTES # BLD AUTO: 1.8 10E3/UL (ref 0.8–5.3)
LYMPHOCYTES NFR BLD AUTO: 21 %
MCH RBC QN AUTO: 25.4 PG (ref 26.5–33)
MCHC RBC AUTO-ENTMCNC: 32.1 G/DL (ref 31.5–36.5)
MCV RBC AUTO: 79 FL (ref 78–100)
MONOCYTES # BLD AUTO: 0.4 10E3/UL (ref 0–1.3)
MONOCYTES NFR BLD AUTO: 5 %
NEUTROPHILS # BLD AUTO: 6 10E3/UL (ref 1.6–8.3)
NEUTROPHILS NFR BLD AUTO: 72 %
NRBC # BLD AUTO: 0 10E3/UL
NRBC BLD AUTO-RTO: 0 /100
PLATELET # BLD AUTO: 262 10E3/UL (ref 150–450)
POTASSIUM SERPL-SCNC: 3.8 MMOL/L (ref 3.4–5.3)
RBC # BLD AUTO: 5.16 10E6/UL (ref 3.8–5.2)
SODIUM SERPL-SCNC: 137 MMOL/L (ref 135–145)
SPECIMEN EXPIRATION DATE: NORMAL
WBC # BLD AUTO: 8.3 10E3/UL (ref 4–11)

## 2024-09-26 PROCEDURE — 86900 BLOOD TYPING SEROLOGIC ABO: CPT | Performed by: EMERGENCY MEDICINE

## 2024-09-26 PROCEDURE — 99284 EMERGENCY DEPT VISIT MOD MDM: CPT | Mod: 25

## 2024-09-26 PROCEDURE — 86901 BLOOD TYPING SEROLOGIC RH(D): CPT | Performed by: EMERGENCY MEDICINE

## 2024-09-26 PROCEDURE — 36415 COLL VENOUS BLD VENIPUNCTURE: CPT | Performed by: EMERGENCY MEDICINE

## 2024-09-26 PROCEDURE — 87491 CHLMYD TRACH DNA AMP PROBE: CPT | Performed by: EMERGENCY MEDICINE

## 2024-09-26 PROCEDURE — 84702 CHORIONIC GONADOTROPIN TEST: CPT | Performed by: EMERGENCY MEDICINE

## 2024-09-26 PROCEDURE — 76801 OB US < 14 WKS SINGLE FETUS: CPT

## 2024-09-26 PROCEDURE — 85004 AUTOMATED DIFF WBC COUNT: CPT | Performed by: EMERGENCY MEDICINE

## 2024-09-26 PROCEDURE — 80048 BASIC METABOLIC PNL TOTAL CA: CPT | Performed by: EMERGENCY MEDICINE

## 2024-09-26 ASSESSMENT — ACTIVITIES OF DAILY LIVING (ADL)
ADLS_ACUITY_SCORE: 35
ADLS_ACUITY_SCORE: 33
ADLS_ACUITY_SCORE: 35

## 2024-09-26 ASSESSMENT — ENCOUNTER SYMPTOMS
ABDOMINAL PAIN: 0
FEVER: 0
DIFFICULTY URINATING: 0

## 2024-09-26 ASSESSMENT — COLUMBIA-SUICIDE SEVERITY RATING SCALE - C-SSRS
1. IN THE PAST MONTH, HAVE YOU WISHED YOU WERE DEAD OR WISHED YOU COULD GO TO SLEEP AND NOT WAKE UP?: NO
2. HAVE YOU ACTUALLY HAD ANY THOUGHTS OF KILLING YOURSELF IN THE PAST MONTH?: NO
6. HAVE YOU EVER DONE ANYTHING, STARTED TO DO ANYTHING, OR PREPARED TO DO ANYTHING TO END YOUR LIFE?: NO

## 2024-09-26 NOTE — ED NOTES
Pt did Chlamydia trachomatis PCR self swab and sent in room 41. No Wet prep sent to lab. Writer called lab to remove after specimen had already been collected.  Notified current MD and current nurse.

## 2024-09-26 NOTE — ED PROVIDER NOTES
EMERGENCY DEPARTMENT ENCOUNTER      NAME: Vero Lima  AGE: 24 year old female  YOB: 2000  MRN: [id number]  EVALUATION DATE & TIME: 2024  8:49 AM    PCP: Cesar Cage    ED PROVIDER: Gary Flowers MD      Chief Complaint   Patient presents with    Vaginal Bleeding - Pregnant         FINAL IMPRESSION:  1. Subchorionic hematoma in first trimester, single or unspecified fetus    2. Threatened miscarriage          ED COURSE & MEDICAL DECISION MAKING:    Pertinent Labs & Imaging studies reviewed. (See chart for details)  24 year old female presents to the Emergency Department for evaluation of vaginal bleeding.  Reports she is roughly 6 weeks pregnant based on last menstrual cycle.  Scheduled see [facility] on  for her 8-week appointment.     ( 1 miscarriage, 2 abortions, 1 living child)      Differential includes threatened , spontaneous , missed , ectopic pregnancy, placenta previa, placental abruption, cervical incompetence. She is not having a large amount of bleeding or suspicious symptoms suggesting significant anemia secondary to this vaginal bleeding. Rh postiive and does not require Rhogam.     Emergency department ultrasound without fetal heart tones and a formal ultrasound was obtained which confirms spontaneous  (miscarriage). B-HCG obtained for follow up.     Expressed sympathy and discussed likely course of symptoms as well as common reasons for miscarriage and reassured patient that patient that miscarriage is generally entirely outside of patient control. She should  Follow up with obstetrics in 2-4 days for further evaluation and management. All questions were answered and the patient is comfortable with plan for discharge. The patient was discharged in stable condition.      POC Ultrasound reveals normal fetal heart tones fetal movement. Given this history, exam, and reassuring ultrasound, this patient likely has vaginal  bleeding secondary to a threatened . A B-HCG was indicated and she should have close follow up with an obstetrician in 2 days. She should return to the emergency department for increased bleeding (more than 1 pad per hour), increased pain, fever, lightheadedness/syncope, or any other concerns.  All questions were answered and the patient is comfortable with plan for discharge. The patient was discharged in stable condition.    ED Course as of 24 1257   Thu Sep 26, 2024   1035 ABO/Rh(D): O POS  RhoGAM not indicated   1036 hCG Quantitative(!): 81,785   1251 Hours refusing pelvic with does not does not want a wait for wet prep results.  Will self collect gonorrhea and chlamydia.  Discussed ultrasound that shows subchorionic hemorrhage as well as threatened miscarriage.   1251 Follow-up with her OB doctor early next week for repeat hCG level   1252 WBC: 8.3   1252 Hemoglobin: 13.1   1252 Platelet Count: 262   1252 hCG Quantitative(!): 81,785  Intrauterine pregnancy with subchorionic hemorrhage noted.   1252 Likely threatened miscarriage   1253 Wet prep will not be collected because patient is leaving because of wait time       Medical Decision Making  Obtained supplemental history:Supplemental history obtained?: Documented in chart  Reviewed external records: External records reviewed?: No  Care impacted by chronic illness:N/A  Care significantly affected by social determinants of health:Access to Medical Care  Did you consider but not order tests?: Work up considered but not performed and documented in chart, if applicable  Did you interpret images independently?: Independent interpretation of ECG and images noted in documentation, when applicable.  Consultation discussion with other provider:Did you involve another provider (consultant, , pharmacy, etc.)?: No  Discharge. No recommendations on prescription strength medication(s). I considered admission, but ultimately discharged patient stable  hemoglobin ultrasound showing intrauterine pregnancy.  Not Applicable            At the conclusion of the encounter I discussed the results of all of the tests and the disposition. The questions were answered. The patient or family acknowledged understanding and was agreeable with the care plan.         MEDICATIONS GIVEN IN THE EMERGENCY:  Medications - No data to display    NEW PRESCRIPTIONS STARTED AT TODAY'S ER VISIT  New Prescriptions    No medications on file          =================================================================    HPI    Patient information was obtained from:  patient            Vero Lima is a 24 year old female with a pertinent history of depression, gestational hypertension who presents to this ED for evaluation of vaginal bleeding.  No pelvic pain.    Fifth pregnancy.  1 previous miscarriage.  Had some vaginal spotting yesterday and became heavier overnight but now spotting.  Also wants gonorrhea and Chlamydia testing.  Same sexual partner.  Denies fever.  Denies vaginal discharge.  No problems urinating.  No abdominal pain.  No trauma    Not any medications      REVIEW OF SYSTEMS   Review of Systems   Constitutional:  Negative for fever.   Gastrointestinal:  Negative for abdominal pain.   Genitourinary:  Positive for vaginal bleeding. Negative for difficulty urinating and vaginal discharge.       PAST MEDICAL HISTORY:  Past Medical History:   Diagnosis Date    Depression     Gestational hypertension 4/9/2021    Developed during labor only.  [facility] labs within normal limits, no proteinuria noted. No other signs and symptoms of preeclampsia noted other than pretibial edema.    Migraines        PAST SURGICAL HISTORY:  Past Surgical History:   Procedure Laterality Date    NO HISTORY OF SURGERY             CURRENT MEDICATIONS:    hydrOXYzine HCl (ATARAX) 10 MG tablet  sertraline (ZOLOFT) 25 MG tablet  valACYclovir (VALTREX) 1000 mg tablet        ALLERGIES:  Allergies   Allergen  "Reactions    Tomato Hives       FAMILY HISTORY:  Family History   Problem Relation Age of Onset    Diabetes Type 2  Mother     Hypertension No family hx of     Cancer No family hx of     Coronary Artery Disease No family hx of     Hyperlipidemia No family hx of     Depression Mother     Diabetes Mother     Hypertension Mother        SOCIAL HISTORY:   Social History     Socioeconomic History    Marital status: Single    Years of education: 12   Tobacco Use    Smoking status: Every Day     Types: Vaping Device     Passive exposure: Current    Smokeless tobacco: Never   Vaping Use    Vaping status: Every Day    Substances: Nicotine    Passive vaping exposure: Yes   Substance and Sexual Activity    Alcohol use: Yes     Comment: Alcoholic Drinks/day: 1 per month    Drug use: Not Currently     Types: Marijuana    Sexual activity: Yes     Partners: Male     Birth control/protection: Abstinence     Social Determinants of Health      Received from [employer] [facility]    Financial Resource Strain    Received from [employer] [facility]    Social Connections       VITALS:  /67   Pulse 70   Temp 98.6  F (37  C) (Oral)   Resp 17   Ht 1.702 m (5' 7\")   Wt 77.7 kg (171 lb 4.8 oz)   LMP 05/23/2024 (Exact Date)   SpO2 100%   BMI 26.83 kg/m      PHYSICAL EXAM      Vitals: /67   Pulse 70   Temp 98.6  F (37  C) (Oral)   Resp 17   Ht 1.702 m (5' 7\")   Wt 77.7 kg (171 lb 4.8 oz)   LMP 05/23/2024 (Exact Date)   SpO2 100%   BMI 26.83 kg/m    General: Appears in no acute distress, awake, alert, interactive.  Eyes: Conjunctivae non-injected. Sclera anicteric.  HENT: Atraumatic.  Neck: Supple.  Respiratory/Chest: Respiration unlabored.  Abdomen: non distended, no abdominal tenderness  Musculoskeletal: Normal extremities. No edema or erythema.  Skin: Normal color. No rash or " diaphoresis.  Neurologic: Face symmetric, moves all extremities spontaneously. Speech clear.  Psychiatric: Oriented to person, place, and time. Affect appropriate.    LAB:  All pertinent labs reviewed and interpreted.  Results for orders placed or performed during the hospital encounter of 09/26/24   OB  US 1st trimester w transvag    Impression    IMPRESSION:     1.  Single living intrauterine gestation at 6 weeks and 5 days, EDC 5/17/2025.    2.  Small subchorionic hemorrhage.       HCG quantitative pregnancy (blood)   Result Value Ref Range    hCG Quantitative 81,785 (H) <5 mIU/mL   Basic metabolic panel   Result Value Ref Range    Sodium 137 135 - 145 mmol/L    Potassium 3.8 3.4 - 5.3 mmol/L    Chloride 106 98 - 107 mmol/L    Carbon Dioxide (CO2) 20 (L) 22 - 29 mmol/L    Anion Gap 11 7 - 15 mmol/L    Urea Nitrogen 5.7 (L) 6.0 - 20.0 mg/dL    Creatinine 0.57 0.51 - 0.95 mg/dL    GFR Estimate >90 >60 mL/min/1.73m2    Calcium 8.8 8.8 - 10.4 mg/dL    Glucose 105 (H) 70 - 99 mg/dL   CBC with platelets and differential   Result Value Ref Range    WBC Count 8.3 4.0 - 11.0 10e3/uL    RBC Count 5.16 3.80 - 5.20 10e6/uL    Hemoglobin 13.1 11.7 - 15.7 g/dL    Hematocrit 40.8 35.0 - 47.0 %    MCV 79 78 - 100 fL    MCH 25.4 (L) 26.5 - 33.0 pg    MCHC 32.1 31.5 - 36.5 g/dL    RDW 16.2 (H) 10.0 - 15.0 %    Platelet Count 262 150 - 450 10e3/uL    % Neutrophils 72 %    % Lymphocytes 21 %    % Monocytes 5 %    % Eosinophils 1 %    % Basophils 1 %    % Immature Granulocytes 0 %    NRBCs per 100 WBC 0 <1 /100    Absolute Neutrophils 6.0 1.6 - 8.3 10e3/uL    Absolute Lymphocytes 1.8 0.8 - 5.3 10e3/uL    Absolute Monocytes 0.4 0.0 - 1.3 10e3/uL    Absolute Eosinophils 0.0 0.0 - 0.7 10e3/uL    Absolute Basophils 0.1 0.0 - 0.2 10e3/uL    Absolute Immature Granulocytes 0.0 <=0.4 10e3/uL    Absolute NRBCs 0.0 10e3/uL   Extra Red Top Tube   Result Value Ref Range    Hold Specimen JI    Adult Type and Screen   Result Value Ref Range     ABO/RH(D) O POS     Antibody Screen Negative Negative    SPECIMEN EXPIRATION DATE [id number]        RADIOLOGY:  Reviewed all pertinent imaging. Please see official radiology report.  OB  US 1st trimester w transvag   Final Result   IMPRESSION:       1.  Single living intrauterine gestation at 6 weeks and 5 days, EDC 5/17/2025.      2.  Small subchorionic hemorrhage.                        I, Dolores Luke, am serving as a scribe to document services personally performed by Gary Flowers MD based on my observation and the provider's statements to me. I, Gary Flowers MD, attest that Dolores Luke is acting in a scribe capacity, has observed my performance of the services and has documented them in accordance with my direction.    Gary Flowers MD  [facility] EMERGENCY DEPARTMENT  [address] [place] [id number]  [phone]       Gary Flowers MD  09/26/24 9824

## 2024-09-26 NOTE — DISCHARGE INSTRUCTIONS
You are 6 weeks and 5 days along and due May 17th 2025 Follow-up with OB/GYN early next week for recheck.  Your gonorrhea and chlamydia testing is pending.  Return to the ER return to the emergency department for increased bleeding (more than 1 pad per hour), increased pain, fever, lightheadedness/syncope, or any other concerns.

## 2024-09-26 NOTE — ED TRIAGE NOTES
Pt is pregnant and due to see OB clinic on 10/9.  Here due to concerns of spotting that got heavier last night and did not need to use any pads and denies any clots or abdominal pain or other vaginal discharge.  Also requests to have STD check but denies exposure.

## 2024-09-26 NOTE — ED NOTES
2, LMP 8-10, vaginal bleeding last PM, bight red, spotting this am only after void. Brown in color currently.No pain

## 2024-09-26 NOTE — ED PROVIDER NOTES
Lab called-swab specimen was not adequate for GC testing. Discussed this with patient, recommend return here for another swab or follow up with OBGYN as recommended. Patient verbalized understanding.      Haydee Torres, ARTI  09/26/24 0339

## 2024-09-26 NOTE — Clinical Note
Vero Lima was seen and treated in our emergency department on 9/26/2024.  She may return to work on 09/27/2024.  No lifting greater than 20 lbs until cleared by OB/gyn     If you have any questions or concerns, please don't hesitate to call.      Gary Flowers MD

## 2024-10-17 NOTE — PROGRESS NOTES
"Vero is a 23 year old who is being evaluated via a billable video visit.      How would you like to obtain your AVS? MyChart  If the video visit is dropped, the invitation should be resent by: Text to cell phone: [phone]  Will anyone else be joining your video visit? No        Assessment & Plan     ICD-10-CM    1. HSV-1 infection   B00.9 valACYclovir (VALTREX) 1000 mg tablet      2. Sciatica of left side  M54.32 cyclobenzaprine (FLEXERIL) 5 MG tablet          Patient here today for refill for valacyclovir for oral sores that she gets once a month and sometimes every other month.  Discussed the dose of 2 mg 2 times a day x1 day.  Prescription refill provided.  She is also having some pain that starts at her left buttock area and goes down to her leg.  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  She would like some pain control and limited prescription for Flexeril provided.  Discussed that this needs further evaluation.  Patient will schedule a follow-up in clinic.  Warning signs and symptoms discussed with the patient to seek acute care- including numbness, weakness or any other red flag symptoms.     Nicotine/Tobacco Cessation:  She reports that she has been smoking vaping device. She has never used smokeless tobacco.  Nicotine/Tobacco Cessation Plan:         BMI:   Estimated body mass index is 28.25 kg/m  as calculated from the following:    Height as of 10/24/22: 1.676 m (5' 6\").    Weight as of 10/24/22: 79.4 kg (175 lb).       MEDICATIONS:   Orders Placed This Encounter   Medications     valACYclovir (VALTREX) 1000 mg tablet     Sig: Take 2 tablets (2,000 mg) by mouth 2 times daily     Dispense:  8 tablet     Refill:  4     cyclobenzaprine (FLEXERIL) 5 MG tablet     Sig: Take 1 tablet (5 mg) by mouth 3 times daily as needed for muscle spasms     Dispense:  30 tablet     Refill:  0          - Continue other medications without change    Jack Hou MD  [facility]    Letitia Phma is a 23 year old, presenting for the following health issues:  Recheck Medication (Med check)        6/13/2023    12:48 PM   Additional Questions   Roomed by Ly Guillermo CMA     History of Present Illness       Reason for visit:  Because they insist on this in order to get my medication refilled    She eats 2-3 servings of fruits and vegetables daily.She consumes 2 sweetened beverage(s) daily.She exercises with enough effort to increase her heart rate 20 to 29 minutes per day.  She exercises with enough effort to increase her heart rate 3 or less days per week.   She is taking medications regularly.     Patient Active Problem List   Diagnosis     HSV-1 infection      Current Outpatient Medications   Medication     cyclobenzaprine (FLEXERIL) 5 MG tablet     valACYclovir (VALTREX) 1000 mg tablet     No current facility-administered medications for this visit.         Review of Systems   Constitutional, HEENT, cardiovascular, pulmonary, gi and gu systems are negative, except as otherwise noted.      Objective           Vitals:  No vitals were obtained today due to virtual visit.    Physical Exam   GENERAL: Healthy, alert and no distress  EYES: Eyes grossly normal to inspection.  No discharge or erythema, or obvious scleral/conjunctival abnormalities.  RESP: No audible wheeze, cough, or visible cyanosis.  No visible retractions or increased work of breathing.    SKIN: Visible skin clear. No significant rash, abnormal pigmentation or lesions.  NEURO: Cranial nerves grossly intact.  Mentation and speech appropriate for age.  PSYCH: Mentation appears normal, affect normal/bright, judgement and insight intact, normal speech and appearance well-groomed.              Video-Visit Details    Type of service:  Video Visit   Video Start Time: 1:03 PM  Video End Time:1:15 PM    Originating Location (pt. Location): Home  Distant Location (provider location):   Off-site  Platform used for Video Visit: Clarice     No

## 2024-11-06 ENCOUNTER — MYC MEDICAL ADVICE (OUTPATIENT)
Dept: FAMILY MEDICINE | Facility: CLINIC | Age: 24
End: 2024-11-06

## 2024-11-06 ENCOUNTER — VIRTUAL VISIT (OUTPATIENT)
Dept: FAMILY MEDICINE | Facility: CLINIC | Age: 24
End: 2024-11-06
Payer: COMMERCIAL

## 2024-11-06 DIAGNOSIS — K52.9 ACUTE GASTROENTERITIS: Primary | ICD-10-CM

## 2024-11-06 DIAGNOSIS — N89.8 VAGINAL DISCHARGE: ICD-10-CM

## 2024-11-06 PROCEDURE — 99213 OFFICE O/P EST LOW 20 MIN: CPT | Mod: 95 | Performed by: FAMILY MEDICINE

## 2024-11-06 NOTE — LETTER
November 6, 2024      Vero SANDERS Lima  GENERAL DELIVERY  [place] [address]        To Whom It May Concern:    Vero SANDERS Lima was seen in our clinic. She may return to work without restriction on November 7th. Please excuse her for November 5th and November 6th due to illness.       Sincerely,

## 2024-11-06 NOTE — PROGRESS NOTES
"Vero is a 24 year old who is being evaluated via a billable video visit.    How would you like to obtain your AVS? MyChart  If the video visit is dropped, the invitation should be resent by: Text to cell phone: [phone]  Will anyone else be joining your video visit? No      Assessment & Plan     Acute gastroenteritis  Acute, resolved. Letter written for work.     Vaginal discharge  Acute, unclear if BV versus natural pH or vaginal changes after medically induced . Recommend testing as below. If positive for BV, recommend oral metronidazole over topical.   - Wet prep - lab collect            BMI  Estimated body mass index is 26.83 kg/m  as calculated from the following:    Height as of 24: 1.702 m (5' 7\").    Weight as of 24: 77.7 kg (171 lb 4.8 oz).   Weight management plan: Discussed healthy diet and exercise guidelines      See Patient Instructions    Subjective   Vero is a 24 year old, presenting for the following health issues:  History of Present Illness (Would like to discuss health condition. Stomach problems. )      2024    12:16 PM   Additional Questions   Roomed by Steve TURPIN MA     Video Start Time: 12:23 PM    History of Present Illness       Reason for visit:  Stomach problems  Symptom onset:  1-3 days ago  Symptoms include:  Diarrhea and pain  Symptom intensity:  Moderate  Symptom progression:  Improving  Had these symptoms before:  Yes  Has tried/received treatment for these symptoms:  Yes  Previous treatment was successful:  Yes  What makes it worse:  None  What makes it better:  Pepto Bismol   She is taking medications regularly.     Patient works in a group home, has sick contacts. Patient having diarrhea for the last day or two. Patient with soft BM this AM, abdominal pain decreased, pepto bismol treating symptoms.     Denies vomiting, bloody stool, watery stool, fatty containing stool.     Having brown discharge. No drug allergies.       Review of " "Systems  Constitutional, HEENT, cardiovascular, pulmonary, gi and gu systems are negative, except as otherwise noted.      Objective    Vitals - Patient Reported  Weight (Patient Reported): 76.2 kg (168 lb)  Height (Patient Reported): 167.6 cm (5' 6\")  BMI (Based on Pt Reported Ht/Wt): 27.12  Pain Score: No Pain (0)        Physical Exam   GENERAL: alert and no distress  EYES: Eyes grossly normal to inspection.  No discharge or erythema, or obvious scleral/conjunctival abnormalities.  RESP: No audible wheeze, cough, or visible cyanosis.    SKIN: Visible skin clear. No significant rash, abnormal pigmentation or lesions.  NEURO: Cranial nerves grossly intact.  Mentation and speech appropriate for age.  PSYCH: Appropriate affect, tone, and pace of words      Video-Visit Details    Type of service:  Video Visit   Video End Time: 12:31PM  Originating Location (pt. Location): Home    Distant Location (provider location):  On-site  Platform used for Video Visit: Clarice  Signed Electronically by: ANNELISE PORTER DO    "

## 2024-11-07 ENCOUNTER — MYC MEDICAL ADVICE (OUTPATIENT)
Dept: FAMILY MEDICINE | Facility: CLINIC | Age: 24
End: 2024-11-07
Payer: COMMERCIAL

## 2024-11-07 NOTE — TELEPHONE ENCOUNTER
You saw patient yesterday 11/6/24. Work note states she may have returned to work today 11/7. Patient did not return to work please advise on another work note excusing her another day

## 2024-11-07 NOTE — LETTER
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 She may return to work without restrictions 11/8/2024.       Sincerely,        ANNELISE PORTER, DO

## 2024-11-09 ENCOUNTER — MYC REFILL (OUTPATIENT)
Dept: FAMILY MEDICINE | Facility: CLINIC | Age: 24
End: 2024-11-09

## 2024-11-09 ENCOUNTER — HEALTH MAINTENANCE LETTER (OUTPATIENT)
Age: 24
End: 2024-11-09

## 2024-11-09 DIAGNOSIS — F41.9 ANXIETY: ICD-10-CM

## 2024-11-11 RX ORDER — SERTRALINE HYDROCHLORIDE 25 MG/1
25 TABLET, FILM COATED ORAL DAILY
Qty: 30 TABLET | Refills: 0 | Status: SHIPPED | OUTPATIENT
Start: 2024-11-11

## 2024-11-11 RX ORDER — HYDROXYZINE HYDROCHLORIDE 10 MG/1
10-20 TABLET, FILM COATED ORAL 3 TIMES DAILY PRN
Qty: 30 TABLET | Refills: 0 | Status: SHIPPED | OUTPATIENT
Start: 2024-11-11

## 2025-01-21 ENCOUNTER — VIRTUAL VISIT (OUTPATIENT)
Dept: FAMILY MEDICINE | Facility: CLINIC | Age: 25
End: 2025-01-21

## 2025-01-21 DIAGNOSIS — N89.8 VAGINAL DISCHARGE: Primary | ICD-10-CM

## 2025-01-21 DIAGNOSIS — Z11.3 SCREEN FOR STD (SEXUALLY TRANSMITTED DISEASE): ICD-10-CM

## 2025-01-21 PROCEDURE — 98005 SYNCH AUDIO-VIDEO EST LOW 20: CPT | Performed by: PHYSICIAN ASSISTANT

## 2025-01-21 RX ORDER — FLUCONAZOLE 150 MG/1
150 TABLET ORAL ONCE
Qty: 1 TABLET | Refills: 0 | Status: SHIPPED | OUTPATIENT
Start: 2025-01-21 | End: 2025-01-21

## 2025-01-21 NOTE — PROGRESS NOTES
"Vero is a 24 year old who is being evaluated via a billable video visit.    How would you like to obtain your AVS? MyChart  If the video visit is dropped, the invitation should be resent by: Text to cell phone: [phone]  Will anyone else be joining your video visit? No      Assessment & Plan     Vaginal discharge  She does have lab visit already schedule later today.  After we discussed, she did want to forgo the lab testing and just get treated for yeast.  I do think getting the lab to confirm would be preferred, as this is my first visit with patient and do not know her history as well as PCP.  But if cannot make lab visit, will call in med based on previous history.  - Wet prep - lab collect; Future    Screen for STD (sexually transmitted disease)    - NEISSERIA GONORRHOEA PCR; Future  - CHLAMYDIA TRACHOMATIS PCR; Future                Subjective   Vero is a 24 year old, presenting for the following health issues:  STD      1/21/2025     9:14 AM   Additional Questions   Roomed by India garcias   Accompanied by self     STD  This is a new (routine check, no current sx) problem.        Vaginal Symptoms  Onset/Duration: 3 days   Description:  Vaginal Discharge: white   Itching (Pruritis): YES  Burning sensation:  No  Odor: YES  Accompanying Signs & Symptoms:  Urinary symptoms: No  Abdominal pain: No  Fever: No  History:   Sexually active: YES  New Partner: No  Possibility of Pregnancy:  No  Recent antibiotic use: No  Previous vaginitis issues: YES  Precipitating or alleviating factors: None  Therapies tried and outcome: none        Review of Systems  Constitutional, HEENT, cardiovascular, pulmonary, gi and gu systems are negative, except as otherwise noted.      Objective    Vitals - Patient Reported  Weight (Patient Reported): 78.5 kg (173 lb)  Height (Patient Reported): 167.6 cm (5' 6\")  BMI (Based on Pt Reported Ht/Wt): 27.92        Physical Exam   GENERAL: alert and no distress  EYES: Eyes grossly normal to " inspection.  No discharge or erythema, or obvious scleral/conjunctival abnormalities.  RESP: No audible wheeze, cough, or visible cyanosis.    SKIN: Visible skin clear. No significant rash, abnormal pigmentation or lesions.  NEURO: Cranial nerves grossly intact.  Mentation and speech appropriate for age.  PSYCH: Appropriate affect, tone, and pace of words          Video-Visit Details    Type of service:  Video Visit   Originating Location (pt. Location): Home    Distant Location (provider location):  Off-site  Platform used for Video Visit: [facility]  Signed Electronically by: Dago Lo PA-C

## 2025-02-15 ENCOUNTER — E-VISIT (OUTPATIENT)
Dept: URGENT CARE | Facility: CLINIC | Age: 25
End: 2025-02-15
Payer: MEDICAID

## 2025-02-15 DIAGNOSIS — J06.9 VIRAL URI WITH COUGH: Primary | ICD-10-CM

## 2025-02-15 DIAGNOSIS — J06.9 ACUTE UPPER RESPIRATORY INFECTION, UNSPECIFIED: Primary | ICD-10-CM

## 2025-02-15 NOTE — LETTER
February 15, 2025      Vero SANDERS Lima  [address]   [place] [address]        To Whom It May Concern:    Vero SANDERS Lima  was seen by tuan.  Please excuse her from work due to illness.        Sincerely,        Fan Monteiro MD    Electronically signed

## 2025-02-15 NOTE — LETTER
February 15, 2025      Vero SANDERS Lima  [address]   [place] [address]        To Whom It May Concern:    Vero Lima  was seen on Feliz 15.  Please excuse her  from work on Jan 16 and Jan 17th.        Sincerely,        Fan Monteiro MD    Electronically signed

## 2025-02-16 NOTE — PATIENT INSTRUCTIONS
I would do nasal saline flushes. This is not bacterial. Note for work sent.    Viral Respiratory Infection: Care Instructions  Overview     A viral respiratory infection is an infection of the nose, sinuses, or throat caused by a virus. Colds and the flu are common types of viral respiratory infections.  The symptoms of a viral respiratory infection often start quickly. They include a fever, sore throat, and runny nose. You may also just not feel well. Or you may not want to eat much.  Most viral infections can be treated with home care. This may include drinking lots of fluids and taking over-the-counter pain medicine. You will probably feel better in 4 to 10 days.  Antibiotics are not used to treat a viral infection. Antibiotics don't kill viruses, so they won't help cure a viral illness.  In some cases, a doctor may prescribe antiviral medicine to help your body fight a serious viral infection.  Follow-up care is a key part of your treatment and safety. Be sure to make and go to all appointments, and call your doctor if you are having problems. It's also a good idea to know your test results and keep a list of the medicines you take.  How can you care for yourself at home?  To prevent dehydration, drink plenty of fluids. Choose water and other clear liquids until you feel better. If you have kidney, heart, or liver disease and have to limit fluids, talk with your doctor before you increase the amount of fluids you drink.  Ask your doctor if you can take an over-the-counter pain medicine, such as acetaminophen (Tylenol), ibuprofen (Advil, Motrin), or naproxen (Aleve). Be safe with medicines. Read and follow all instructions on the label. No one younger than 20 should take aspirin. It has been linked to Reye syndrome, a serious illness.  Be careful when taking over-the-counter cold or flu medicines and Tylenol at the same time. Many of these medicines have acetaminophen, which is Tylenol. Read the labels to make  "sure that you are not taking more than the recommended dose. Too much acetaminophen (Tylenol) can be harmful.  Get plenty of rest.  Use saline (saltwater) nasal washes to help keep your nasal passages open and wash out mucus and allergens. You can buy saline nose sprays at a grocery store or drugstore. Follow the instructions on the package. Or you can make your own at home. Add 1 teaspoon of non-iodized salt and 1 teaspoon of baking soda to 2 cups of distilled or boiled and cooled water. Fill a squeeze bottle or neti pot with the nasal wash. Then put the tip into your nostril, and lean over the sink. With your mouth open, gently squirt the liquid. Repeat on the other side.  Use a vaporizer or humidifier to add moisture to your bedroom. Follow the instructions for cleaning the machine.  Do not smoke or allow others to smoke around you. If you need help quitting, talk to your doctor about stop-smoking programs and medicines. These can increase your chances of quitting for good.  When should you call for help?   Call 911 anytime you think you may need emergency care. For example, call if:    You have severe trouble breathing.   Call your doctor now or seek immediate medical care if:    You have a new or higher fever.     Your fever lasts more than 48 hours.     You have trouble breathing.     You have a fever with a stiff neck or a severe headache.     You are sensitive to light.     You feel very sleepy or confused.   Watch closely for changes in your health, and be sure to contact your doctor if:    You do not get better as expected.   Where can you learn more?  Go to https://www.[employer].net/patiented  Enter Q795 in the search box to learn more about \"Viral Respiratory Infection: Care Instructions.\"  Current as of: April 30, 2024  Content Version: 14.3    2024 [employer].   Care instructions adapted under license by your healthcare professional. If you have questions about a medical condition or this " instruction, always ask your healthcare professional. [employer] [facility] disclaims any warranty or liability for your use of this information.

## 2025-04-29 ENCOUNTER — E-VISIT (OUTPATIENT)
Dept: URGENT CARE | Facility: CLINIC | Age: 25
End: 2025-04-29
Payer: MEDICAID

## 2025-04-29 DIAGNOSIS — J06.9 VIRAL URI WITH COUGH: Primary | ICD-10-CM

## 2025-04-29 PROCEDURE — 99207 PR NON-BILLABLE SERV PER CHARTING: CPT | Performed by: FAMILY MEDICINE

## 2025-04-29 NOTE — LETTER
April 30, 2025      Vero SANDERS Lima  [address]  [place] [address]        To Whom It May Concern:    Vero SANDERS Lima  was seen on 4/29/25.  Please excuse her 4/29-4/30 due to illness.        Sincerely,        DOMENIC Crane CNP    Electronically signed

## 2025-04-30 NOTE — PATIENT INSTRUCTIONS
I would do nasal saline flushes. This is not bacterial needing antibiotics.     Viral Respiratory Infection: Care Instructions  Overview     A viral respiratory infection is an infection of the nose, sinuses, or throat caused by a virus. Colds and the flu are common types of viral respiratory infections.  The symptoms of a viral respiratory infection often start quickly. They include a fever, sore throat, and runny nose. You may also just not feel well. Or you may not want to eat much.  Most viral infections can be treated with home care. This may include drinking lots of fluids and taking over-the-counter pain medicine. You will probably feel better in 4 to 10 days.  Antibiotics are not used to treat a viral infection. Antibiotics don't kill viruses, so they won't help cure a viral illness.  In some cases, a doctor may prescribe antiviral medicine to help your body fight a serious viral infection.  Follow-up care is a key part of your treatment and safety. Be sure to make and go to all appointments, and call your doctor if you are having problems. It's also a good idea to know your test results and keep a list of the medicines you take.  How can you care for yourself at home?  To prevent dehydration, drink plenty of fluids. Choose water and other clear liquids until you feel better. If you have kidney, heart, or liver disease and have to limit fluids, talk with your doctor before you increase the amount of fluids you drink.  Ask your doctor if you can take an over-the-counter pain medicine, such as acetaminophen (Tylenol), ibuprofen (Advil, Motrin), or naproxen (Aleve). Be safe with medicines. Read and follow all instructions on the label. No one younger than 20 should take aspirin. It has been linked to Reye syndrome, a serious illness.  Be careful when taking over-the-counter cold or flu medicines and Tylenol at the same time. Many of these medicines have acetaminophen, which is Tylenol. Read the labels to make  "sure that you are not taking more than the recommended dose. Too much acetaminophen (Tylenol) can be harmful.  Get plenty of rest.  Use saline (saltwater) nasal washes to help keep your nasal passages open and wash out mucus and allergens. You can buy saline nose sprays at a grocery store or drugstore. Follow the instructions on the package. Or you can make your own at home. Add 1 teaspoon of non-iodized salt and 1 teaspoon of baking soda to 2 cups of distilled or boiled and cooled water. Fill a squeeze bottle or neti pot with the nasal wash. Then put the tip into your nostril, and lean over the sink. With your mouth open, gently squirt the liquid. Repeat on the other side.  Use a vaporizer or humidifier to add moisture to your bedroom. Follow the instructions for cleaning the machine.  Do not smoke or allow others to smoke around you. If you need help quitting, talk to your doctor about stop-smoking programs and medicines. These can increase your chances of quitting for good.  When should you call for help?   Call 911 anytime you think you may need emergency care. For example, call if:    You have severe trouble breathing.   Call your doctor now or seek immediate medical care if:    You have a new or higher fever.     Your fever lasts more than 48 hours.     You have trouble breathing.     You have a fever with a stiff neck or a severe headache.     You are sensitive to light.     You feel very sleepy or confused.   Watch closely for changes in your health, and be sure to contact your doctor if:    You do not get better as expected.   Where can you learn more?  Go to https://www.[employer].net/patiented  Enter Q795 in the search box to learn more about \"Viral Respiratory Infection: Care Instructions.\"  Current as of: April 30, 2024  Content Version: 14.4    7721-3288 [employer].   Care instructions adapted under license by your healthcare professional. If you have questions about a medical condition or " this instruction, always ask your healthcare professional. [employer], [facility] disclaims any warranty or liability for your use of this information.